# Patient Record
Sex: FEMALE | NOT HISPANIC OR LATINO
[De-identification: names, ages, dates, MRNs, and addresses within clinical notes are randomized per-mention and may not be internally consistent; named-entity substitution may affect disease eponyms.]

---

## 2017-01-06 ENCOUNTER — PRIOR ORIGINAL RECORDS (OUTPATIENT)
Dept: OTHER | Age: 82
End: 2017-01-06

## 2017-03-13 PROBLEM — I70.0 ATHEROSCLEROSIS OF AORTA (HCC): Status: ACTIVE | Noted: 2017-03-13

## 2017-04-03 PROBLEM — E27.8 ADRENAL NODULE (HCC): Status: ACTIVE | Noted: 2017-04-03

## 2017-04-03 PROBLEM — I77.811 ECTATIC ABDOMINAL AORTA (HCC): Status: ACTIVE | Noted: 2017-04-03

## 2017-07-03 DIAGNOSIS — I10 ESSENTIAL HYPERTENSION, BENIGN: ICD-10-CM

## 2017-07-05 RX ORDER — TRIAMTERENE AND HYDROCHLOROTHIAZIDE 37.5; 25 MG/1; MG/1
1 TABLET ORAL
Qty: 90 TABLET | Refills: 0 | OUTPATIENT
Start: 2017-07-05

## 2017-07-10 ENCOUNTER — PRIOR ORIGINAL RECORDS (OUTPATIENT)
Dept: OTHER | Age: 82
End: 2017-07-10

## 2017-07-12 ENCOUNTER — TELEPHONE (OUTPATIENT)
Dept: FAMILY MEDICINE CLINIC | Facility: CLINIC | Age: 82
End: 2017-07-12

## 2017-07-12 PROBLEM — M47.819 ARTHRITIS OF SPINE: Status: ACTIVE | Noted: 2017-07-12

## 2017-07-12 NOTE — TELEPHONE ENCOUNTER
[facility] requesting a return call to schedule OV-MA Supervisit? ?  Last OV MA Supervisit 5-.

## 2017-07-13 ENCOUNTER — OFFICE VISIT (OUTPATIENT)
Dept: FAMILY MEDICINE CLINIC | Facility: CLINIC | Age: 82
End: 2017-07-13

## 2017-07-13 VITALS
TEMPERATURE: 98 F | DIASTOLIC BLOOD PRESSURE: 78 MMHG | WEIGHT: 158 LBS | BODY MASS INDEX: 29.83 KG/M2 | SYSTOLIC BLOOD PRESSURE: 126 MMHG | HEART RATE: 69 BPM | OXYGEN SATURATION: 96 % | RESPIRATION RATE: 18 BRPM | HEIGHT: 61 IN

## 2017-07-13 DIAGNOSIS — M47.819 ARTHRITIS OF SPINE: ICD-10-CM

## 2017-07-13 DIAGNOSIS — M81.0 OSTEOPOROSIS, UNSPECIFIED OSTEOPOROSIS TYPE, UNSPECIFIED PATHOLOGICAL FRACTURE PRESENCE: ICD-10-CM

## 2017-07-13 DIAGNOSIS — I10 ESSENTIAL HYPERTENSION, BENIGN: ICD-10-CM

## 2017-07-13 DIAGNOSIS — E27.8 ADRENAL NODULE (HCC): ICD-10-CM

## 2017-07-13 DIAGNOSIS — I70.0 ATHEROSCLEROSIS OF AORTA (HCC): ICD-10-CM

## 2017-07-13 DIAGNOSIS — C44.91 BASAL CELL CARCINOMA: ICD-10-CM

## 2017-07-13 DIAGNOSIS — C18.9 ADENOCARCINOMA OF COLON (HCC): ICD-10-CM

## 2017-07-13 DIAGNOSIS — Z87.39 H/O ROTATOR CUFF TEAR: ICD-10-CM

## 2017-07-13 DIAGNOSIS — I77.811 ECTATIC ABDOMINAL AORTA (HCC): ICD-10-CM

## 2017-07-13 DIAGNOSIS — E78.00 PURE HYPERCHOLESTEROLEMIA: ICD-10-CM

## 2017-07-13 DIAGNOSIS — D09.9 SQUAMOUS CELL CARCINOMA IN SITU: Primary | ICD-10-CM

## 2017-07-13 PROCEDURE — 99397 PER PM REEVAL EST PAT 65+ YR: CPT | Performed by: PHYSICIAN ASSISTANT

## 2017-07-13 PROCEDURE — G0439 PPPS, SUBSEQ VISIT: HCPCS | Performed by: PHYSICIAN ASSISTANT

## 2017-07-13 PROCEDURE — 96160 PT-FOCUSED HLTH RISK ASSMT: CPT | Performed by: PHYSICIAN ASSISTANT

## 2017-07-13 RX ORDER — SIMVASTATIN 20 MG
TABLET ORAL
Qty: 90 TABLET | Refills: 3 | Status: SHIPPED | OUTPATIENT
Start: 2017-07-13 | End: 2018-08-20

## 2017-07-13 RX ORDER — TRIAMTERENE AND HYDROCHLOROTHIAZIDE 37.5; 25 MG/1; MG/1
1 TABLET ORAL
Qty: 90 TABLET | Refills: 3 | Status: SHIPPED | OUTPATIENT
Start: 2017-07-13 | End: 2018-07-16

## 2017-07-13 NOTE — PROGRESS NOTES
Jaime Landau is a 80year old female who presents for a MA Supervisit. Pt here for refill on her triamterene and simvastatin. Ran out of triamterene 2-3 days ago.  Noticed increased swelling in her lower legs and feet since running out of her medicatio AST 24 05/14/2015   AST 9 (L) 08/01/2014       Lab Results  Component Value Date   ALT 17 05/19/2016   ALT 18 05/14/2015   ALT 9 (L) 08/01/2014     No results found for: TSH    Lab Results  Component Value Date   BUN 20 05/19/2016   BUN 21 (H) 05/14/2015 multiple medications?: 1-Yes          Have you had any memory issues?: 0-No    Fall/Risk Scorin    Scoring Interpretation: 0 - 3 No Risk     Depression Screening (PHQ-2/PHQ-9): Over the LAST 2 WEEKS   Little interest or pleasure in doing things (over t ago; recommend yearly eye exam    Bone Density Screening      Dexascan Every two years Last Dexa Scan:   XR DEXA BONE DENSITOMETRY (CPT=77080) 04/01/2013    No flowsheet data found.     Pap and Pelvic      Pap: Every 3 yrs age 21-65 or Pap+HPV every 5 yrs a LDL  Annually LDL CHOLESTROL (mg/dL)   Date Value   05/22/2014 62     LDL Cholesterol (mg/dL)   Date Value   05/19/2016 129    No flowsheet data found. Dilated Eye exam  Annually No flowsheet data found. No flowsheet data found.     COPD      Spirom History   Problem Relation Age of Onset   • Psychiatric Neg       SOCIAL HISTORY:   Smoking status: Never Smoker                                                              Smokeless tobacco: Never Used                      Alcohol use:  No              Oc good BS's, no masses, HSM or tenderness  : deferred  RECTAL: deferred  MUSCULOSKELETAL: back is not tender, FROM of the back, no calf tenderness or swelling, Agustina's sign negative.   EXTREMITIES: no cyanosis or clubbing  NEURO: Oriented times three, crani patient is asked to return in 6 months for routine follow up with PCP.   Diet counseling perfomed  Exercise counseling perfomed    SUGGESTED VACCINATIONS - Influenza, Pneumococcal, Zoster, Tetanus     Immunization History   Administered Date(s) Administered

## 2017-07-21 ENCOUNTER — APPOINTMENT (OUTPATIENT)
Dept: LAB | Age: 82
End: 2017-07-21
Attending: PHYSICIAN ASSISTANT
Payer: MEDICARE

## 2017-07-21 DIAGNOSIS — E78.00 PURE HYPERCHOLESTEROLEMIA: ICD-10-CM

## 2017-07-21 DIAGNOSIS — I10 ESSENTIAL HYPERTENSION, BENIGN: ICD-10-CM

## 2017-07-21 LAB
ALBUMIN SERPL-MCNC: 3.7 G/DL (ref 3.5–4.8)
ALP LIVER SERPL-CCNC: 80 U/L (ref 55–142)
ALT SERPL-CCNC: 13 U/L (ref 14–54)
AST SERPL-CCNC: 16 U/L (ref 15–41)
BILIRUB SERPL-MCNC: 0.4 MG/DL (ref 0.1–2)
BUN BLD-MCNC: 15 MG/DL (ref 8–20)
CALCIUM BLD-MCNC: 9.5 MG/DL (ref 8.3–10.3)
CHLORIDE: 105 MMOL/L (ref 101–111)
CHOLEST SMN-MCNC: 172 MG/DL (ref ?–200)
CO2: 27 MMOL/L (ref 22–32)
CREAT BLD-MCNC: 0.79 MG/DL (ref 0.55–1.02)
GLUCOSE BLD-MCNC: 101 MG/DL (ref 70–99)
HDLC SERPL-MCNC: 62 MG/DL (ref 45–?)
HDLC SERPL: 2.77 {RATIO} (ref ?–4.44)
LDLC SERPL CALC-MCNC: 73 MG/DL (ref ?–130)
LDLC SERPL-MCNC: 37 MG/DL (ref 5–40)
M PROTEIN MFR SERPL ELPH: 7 G/DL (ref 6.1–8.3)
NONHDLC SERPL-MCNC: 110 MG/DL (ref ?–130)
POTASSIUM SERPL-SCNC: 3.7 MMOL/L (ref 3.6–5.1)
SODIUM SERPL-SCNC: 141 MMOL/L (ref 136–144)
TRIGLYCERIDES: 186 MG/DL (ref ?–150)

## 2017-07-21 PROCEDURE — 80061 LIPID PANEL: CPT | Performed by: PHYSICIAN ASSISTANT

## 2017-07-21 PROCEDURE — 36415 COLL VENOUS BLD VENIPUNCTURE: CPT | Performed by: PHYSICIAN ASSISTANT

## 2017-07-21 PROCEDURE — 80053 COMPREHEN METABOLIC PANEL: CPT | Performed by: PHYSICIAN ASSISTANT

## 2018-01-08 ENCOUNTER — TELEPHONE (OUTPATIENT)
Dept: FAMILY MEDICINE CLINIC | Facility: CLINIC | Age: 83
End: 2018-01-08

## 2018-03-20 ENCOUNTER — PRIOR ORIGINAL RECORDS (OUTPATIENT)
Dept: OTHER | Age: 83
End: 2018-03-20

## 2018-04-18 ENCOUNTER — PATIENT OUTREACH (OUTPATIENT)
Dept: FAMILY MEDICINE CLINIC | Facility: CLINIC | Age: 83
End: 2018-04-18

## 2018-04-18 NOTE — PROGRESS NOTES
Spoke with patient about scheduling her MA supervisit. She stated she will need to get back to us to schedule. She would need to arrange transportation.

## 2018-05-08 ENCOUNTER — PATIENT OUTREACH (OUTPATIENT)
Dept: FAMILY MEDICINE CLINIC | Facility: CLINIC | Age: 83
End: 2018-05-08

## 2018-07-16 DIAGNOSIS — I10 ESSENTIAL HYPERTENSION, BENIGN: ICD-10-CM

## 2018-07-16 RX ORDER — TRIAMTERENE AND HYDROCHLOROTHIAZIDE 37.5; 25 MG/1; MG/1
TABLET ORAL
Qty: 90 TABLET | Refills: 0 | OUTPATIENT
Start: 2018-07-16

## 2018-07-16 RX ORDER — TRIAMTERENE AND HYDROCHLOROTHIAZIDE 37.5; 25 MG/1; MG/1
TABLET ORAL
Qty: 30 TABLET | Refills: 0 | Status: SHIPPED | OUTPATIENT
Start: 2018-07-16 | End: 2018-08-20

## 2018-08-20 ENCOUNTER — OFFICE VISIT (OUTPATIENT)
Dept: FAMILY MEDICINE CLINIC | Facility: CLINIC | Age: 83
End: 2018-08-20
Payer: COMMERCIAL

## 2018-08-20 VITALS
SYSTOLIC BLOOD PRESSURE: 124 MMHG | RESPIRATION RATE: 16 BRPM | DIASTOLIC BLOOD PRESSURE: 78 MMHG | HEIGHT: 61 IN | BODY MASS INDEX: 30.28 KG/M2 | OXYGEN SATURATION: 99 % | HEART RATE: 66 BPM | WEIGHT: 160.38 LBS | TEMPERATURE: 97 F

## 2018-08-20 DIAGNOSIS — M47.819 ARTHRITIS OF SPINE: ICD-10-CM

## 2018-08-20 DIAGNOSIS — I70.0 ATHEROSCLEROSIS OF AORTA (HCC): ICD-10-CM

## 2018-08-20 DIAGNOSIS — E27.8 ADRENAL NODULE (HCC): ICD-10-CM

## 2018-08-20 DIAGNOSIS — E78.00 PURE HYPERCHOLESTEROLEMIA: ICD-10-CM

## 2018-08-20 DIAGNOSIS — M81.8 OTHER OSTEOPOROSIS WITHOUT CURRENT PATHOLOGICAL FRACTURE: ICD-10-CM

## 2018-08-20 DIAGNOSIS — Z87.39 H/O ROTATOR CUFF TEAR: ICD-10-CM

## 2018-08-20 DIAGNOSIS — I10 ESSENTIAL HYPERTENSION, BENIGN: Primary | ICD-10-CM

## 2018-08-20 DIAGNOSIS — C18.9 ADENOCARCINOMA OF COLON (HCC): ICD-10-CM

## 2018-08-20 DIAGNOSIS — Z85.038 HISTORY OF MALIGNANT NEOPLASM OF COLON: ICD-10-CM

## 2018-08-20 DIAGNOSIS — I77.811 ECTATIC ABDOMINAL AORTA (HCC): ICD-10-CM

## 2018-08-20 DIAGNOSIS — C44.91 BASAL CELL CARCINOMA, UNSPECIFIED SITE: ICD-10-CM

## 2018-08-20 DIAGNOSIS — D09.9 SQUAMOUS CELL CARCINOMA IN SITU: ICD-10-CM

## 2018-08-20 DIAGNOSIS — B35.1 ONYCHOMYCOSIS: ICD-10-CM

## 2018-08-20 DIAGNOSIS — R06.02 SHORTNESS OF BREATH: ICD-10-CM

## 2018-08-20 PROCEDURE — 99397 PER PM REEVAL EST PAT 65+ YR: CPT | Performed by: PHYSICIAN ASSISTANT

## 2018-08-20 PROCEDURE — 96160 PT-FOCUSED HLTH RISK ASSMT: CPT | Performed by: PHYSICIAN ASSISTANT

## 2018-08-20 PROCEDURE — G0439 PPPS, SUBSEQ VISIT: HCPCS | Performed by: PHYSICIAN ASSISTANT

## 2018-08-20 RX ORDER — TRIAMTERENE AND HYDROCHLOROTHIAZIDE 37.5; 25 MG/1; MG/1
1 TABLET ORAL
Qty: 90 TABLET | Refills: 3 | Status: SHIPPED | OUTPATIENT
Start: 2018-08-20 | End: 2018-11-01 | Stop reason: ALTCHOICE

## 2018-08-20 RX ORDER — SIMVASTATIN 20 MG
TABLET ORAL
Qty: 90 TABLET | Refills: 3 | Status: ON HOLD | OUTPATIENT
Start: 2018-08-20 | End: 2019-06-21

## 2018-08-20 NOTE — PROGRESS NOTES
REASON FOR VISIT:    Mervat Greene is a 80year old female who presents for a MA Supervisit. Pt's daughter states pt seems more short of breath when walking from the car to a building. Has noticed off and on for the past 1 month.  Pt denies feeling shor 11/27/2012 10/6/2011   Total Cholesterol <200 mg/dL 172 222(H) 205(H) 135 139 172 165   Triglycerides <150 mg/dL 186(H) 136 141 106 100 79 115   HDL >45 mg/dL 62 66 64 52 53 73 60   LDL <130 mg/dL 73 129 113 62 66 83 82     BUN and Cr Latest Ref Rng & Unit months?: 0-No  Fall/Risk Scorin        Depression Screening (PHQ-2/PHQ-9): Over the LAST 2 WEEKS   Little interest or pleasure in doing things (over the last two weeks)?: Several days  Feeling down, depressed, or hopeless (over the last two weeks)?: No Annually CREATININE (mg/dL)   Date Value   08/04/2014 0.40     Creatinine (mg/dL)   Date Value   07/21/2017 0.79       Digoxin Serum Conc  Annually No results found for: DIGOXIN          ALLERGIES:     Prednisone                  Comment:Rash after 2 ster Immunization History  Administered            Date(s) Administered    Fluzone Vaccine Medicare ()                          09/05/2017      Influenza             10/06/2011  09/27/2013      Influenza Vaccine, High Dose, Preserv Free bruits  CHEST: no chest tenderness  BREAST: pt declined  LUNGS: clear to auscultation  CARDIO: RRR without murmur, no pitting edema b/l LE, distal pulses 2+ bilaterally  GI: good BS's, no masses, HSM or tenderness  : deferred  RECTAL: deferred  MUSCULOSK podiatry for toenail care  -     PODIATRY - INTERNAL    Shortness of breath  Labs ordered  Echo ordered  ER if worse or symptoms change  -     IRON AND TIBC; Future  -     FERRITIN; Future  -     CARD ECHO 2D DOPPLER (CPT=93306);  Future    Discussed shingr

## 2018-09-05 DIAGNOSIS — E78.00 PURE HYPERCHOLESTEROLEMIA: ICD-10-CM

## 2018-09-05 RX ORDER — SIMVASTATIN 20 MG
TABLET ORAL
Qty: 90 TABLET | Refills: 0 | OUTPATIENT
Start: 2018-09-05

## 2018-09-06 ENCOUNTER — HOSPITAL ENCOUNTER (OUTPATIENT)
Dept: CV DIAGNOSTICS | Age: 83
Discharge: HOME | End: 2018-09-06
Attending: PHYSICIAN ASSISTANT
Payer: MEDICARE

## 2018-09-06 DIAGNOSIS — R06.02 SHORTNESS OF BREATH: ICD-10-CM

## 2018-09-06 PROCEDURE — 93306 TTE W/DOPPLER COMPLETE: CPT | Performed by: PHYSICIAN ASSISTANT

## 2018-09-14 ENCOUNTER — PRIOR ORIGINAL RECORDS (OUTPATIENT)
Dept: OTHER | Age: 83
End: 2018-09-14

## 2018-09-14 ENCOUNTER — LAB ENCOUNTER (OUTPATIENT)
Dept: LAB | Age: 83
End: 2018-09-14
Attending: PHYSICIAN ASSISTANT
Payer: MEDICARE

## 2018-09-14 DIAGNOSIS — E78.00 PURE HYPERCHOLESTEROLEMIA: ICD-10-CM

## 2018-09-14 DIAGNOSIS — R06.02 SHORTNESS OF BREATH: ICD-10-CM

## 2018-09-14 DIAGNOSIS — I10 ESSENTIAL HYPERTENSION, BENIGN: ICD-10-CM

## 2018-09-14 LAB
ALBUMIN SERPL-MCNC: 3.6 G/DL (ref 3.5–4.8)
ALBUMIN/GLOB SERPL: 1 {RATIO} (ref 1–2)
ALP LIVER SERPL-CCNC: 67 U/L (ref 55–142)
ALT SERPL-CCNC: 15 U/L (ref 14–54)
ANION GAP SERPL CALC-SCNC: 8 MMOL/L (ref 0–18)
AST SERPL-CCNC: 20 U/L (ref 15–41)
BASOPHILS # BLD AUTO: 0.02 X10(3) UL (ref 0–0.1)
BASOPHILS NFR BLD AUTO: 0.3 %
BILIRUB SERPL-MCNC: 0.5 MG/DL (ref 0.1–2)
BUN BLD-MCNC: 13 MG/DL (ref 8–20)
BUN/CREAT SERPL: 16.7 (ref 10–20)
CALCIUM BLD-MCNC: 9.5 MG/DL (ref 8.3–10.3)
CHLORIDE SERPL-SCNC: 100 MMOL/L (ref 101–111)
CHOLEST SMN-MCNC: 175 MG/DL (ref ?–200)
CO2 SERPL-SCNC: 29 MMOL/L (ref 22–32)
CREAT BLD-MCNC: 0.78 MG/DL (ref 0.55–1.02)
DEPRECATED HBV CORE AB SER IA-ACNC: 57.4 NG/ML (ref 18–340)
EOSINOPHIL # BLD AUTO: 0.18 X10(3) UL (ref 0–0.3)
EOSINOPHIL NFR BLD AUTO: 2.7 %
ERYTHROCYTE [DISTWIDTH] IN BLOOD BY AUTOMATED COUNT: 14.2 % (ref 11.5–16)
GLOBULIN PLAS-MCNC: 3.7 G/DL (ref 2.5–4)
GLUCOSE BLD-MCNC: 79 MG/DL (ref 70–99)
HCT VFR BLD AUTO: 45.6 % (ref 34–50)
HDLC SERPL-MCNC: 60 MG/DL (ref 40–59)
HGB BLD-MCNC: 15.2 G/DL (ref 12–16)
IMMATURE GRANULOCYTE COUNT: 0.01 X10(3) UL (ref 0–1)
IMMATURE GRANULOCYTE RATIO %: 0.1 %
IRON SATURATION: 20 % (ref 15–50)
IRON: 80 UG/DL (ref 28–170)
LDLC SERPL CALC-MCNC: 77 MG/DL (ref ?–100)
LYMPHOCYTES # BLD AUTO: 2.61 X10(3) UL (ref 0.9–4)
LYMPHOCYTES NFR BLD AUTO: 39 %
M PROTEIN MFR SERPL ELPH: 7.3 G/DL (ref 6.1–8.3)
MCH RBC QN AUTO: 31.1 PG (ref 27–33.2)
MCHC RBC AUTO-ENTMCNC: 33.3 G/DL (ref 31–37)
MCV RBC AUTO: 93.4 FL (ref 81–100)
MONOCYTES # BLD AUTO: 0.53 X10(3) UL (ref 0.1–1)
MONOCYTES NFR BLD AUTO: 7.9 %
NEUTROPHIL ABS PRELIM: 3.35 X10 (3) UL (ref 1.3–6.7)
NEUTROPHILS # BLD AUTO: 3.35 X10(3) UL (ref 1.3–6.7)
NEUTROPHILS NFR BLD AUTO: 50 %
NONHDLC SERPL-MCNC: 115 MG/DL (ref ?–130)
OSMOLALITY SERPL CALC.SUM OF ELEC: 283 MOSM/KG (ref 275–295)
PLATELET # BLD AUTO: 230 10(3)UL (ref 150–450)
POTASSIUM SERPL-SCNC: 3.3 MMOL/L (ref 3.6–5.1)
RBC # BLD AUTO: 4.88 X10(6)UL (ref 3.8–5.1)
RED CELL DISTRIBUTION WIDTH-SD: 48.4 FL (ref 35.1–46.3)
SODIUM SERPL-SCNC: 137 MMOL/L (ref 136–144)
TOTAL IRON BINDING CAPACITY: 395 UG/DL (ref 240–450)
TRANSFERRIN SERPL-MCNC: 265 MG/DL (ref 200–360)
TRIGL SERPL-MCNC: 188 MG/DL (ref 30–149)
VLDLC SERPL CALC-MCNC: 38 MG/DL (ref 0–30)
WBC # BLD AUTO: 6.7 X10(3) UL (ref 4–13)

## 2018-09-14 PROCEDURE — 82728 ASSAY OF FERRITIN: CPT

## 2018-09-14 PROCEDURE — 85025 COMPLETE CBC W/AUTO DIFF WBC: CPT

## 2018-09-14 PROCEDURE — 83550 IRON BINDING TEST: CPT

## 2018-09-14 PROCEDURE — 80061 LIPID PANEL: CPT

## 2018-09-14 PROCEDURE — 83540 ASSAY OF IRON: CPT

## 2018-09-14 PROCEDURE — 80053 COMPREHEN METABOLIC PANEL: CPT

## 2018-09-14 PROCEDURE — 36415 COLL VENOUS BLD VENIPUNCTURE: CPT

## 2018-09-15 ENCOUNTER — TELEPHONE (OUTPATIENT)
Dept: FAMILY MEDICINE CLINIC | Facility: CLINIC | Age: 83
End: 2018-09-15

## 2018-09-15 DIAGNOSIS — E87.6 LOW BLOOD POTASSIUM: Primary | ICD-10-CM

## 2018-09-15 NOTE — TELEPHONE ENCOUNTER
----- Message from [place], [place] sent at 9/15/2018  8:14 AM CDT -----  CBC normal. Potassium is low; needs repeat potassium ASAP. Sugar, kidney and liver function are normal. Iron studies normal. Triglycerides a little elevated; monitor starches and carbs in diet.

## 2018-09-17 ENCOUNTER — PRIOR ORIGINAL RECORDS (OUTPATIENT)
Dept: OTHER | Age: 83
End: 2018-09-17

## 2018-09-17 ENCOUNTER — APPOINTMENT (OUTPATIENT)
Dept: LAB | Age: 83
End: 2018-09-17
Attending: PHYSICIAN ASSISTANT
Payer: MEDICARE

## 2018-09-17 DIAGNOSIS — E87.6 LOW BLOOD POTASSIUM: ICD-10-CM

## 2018-09-17 DIAGNOSIS — E87.6 HYPOKALEMIA: Primary | ICD-10-CM

## 2018-09-17 LAB — POTASSIUM SERPL-SCNC: 3.3 MMOL/L (ref 3.6–5.1)

## 2018-09-17 PROCEDURE — 84132 ASSAY OF SERUM POTASSIUM: CPT

## 2018-09-17 PROCEDURE — 36415 COLL VENOUS BLD VENIPUNCTURE: CPT

## 2018-09-17 RX ORDER — POTASSIUM CHLORIDE 750 MG/1
10 TABLET, FILM COATED, EXTENDED RELEASE ORAL DAILY
Qty: 7 TABLET | Refills: 0 | Status: SHIPPED | OUTPATIENT
Start: 2018-09-17 | End: 2018-09-24

## 2018-09-20 RX ORDER — POTASSIUM CHLORIDE 750 MG/1
TABLET, FILM COATED, EXTENDED RELEASE ORAL
Qty: 90 TABLET | Refills: 0 | OUTPATIENT
Start: 2018-09-20

## 2018-09-20 NOTE — TELEPHONE ENCOUNTER
Last Visit- 9/17/18        Last refill 9/17/18  7 day supply           Please Approve or deny Medication. I wasn't sure if you wanted or needed to do another order.

## 2018-09-25 ENCOUNTER — APPOINTMENT (OUTPATIENT)
Dept: LAB | Age: 83
End: 2018-09-25
Attending: PHYSICIAN ASSISTANT
Payer: MEDICARE

## 2018-09-25 ENCOUNTER — PRIOR ORIGINAL RECORDS (OUTPATIENT)
Dept: OTHER | Age: 83
End: 2018-09-25

## 2018-09-25 DIAGNOSIS — E87.6 HYPOKALEMIA: ICD-10-CM

## 2018-09-25 LAB — POTASSIUM SERPL-SCNC: 3.4 MMOL/L (ref 3.6–5.1)

## 2018-09-25 PROCEDURE — 36415 COLL VENOUS BLD VENIPUNCTURE: CPT

## 2018-09-25 PROCEDURE — 84132 ASSAY OF SERUM POTASSIUM: CPT

## 2018-09-27 RX ORDER — POTASSIUM CHLORIDE 1500 MG/1
TABLET, FILM COATED, EXTENDED RELEASE ORAL
Qty: 90 TABLET | Refills: 0 | OUTPATIENT
Start: 2018-09-27

## 2018-10-02 ENCOUNTER — APPOINTMENT (OUTPATIENT)
Dept: LAB | Age: 83
End: 2018-10-02
Attending: PHYSICIAN ASSISTANT
Payer: MEDICARE

## 2018-10-02 ENCOUNTER — PRIOR ORIGINAL RECORDS (OUTPATIENT)
Dept: OTHER | Age: 83
End: 2018-10-02

## 2018-10-02 DIAGNOSIS — E87.6 LOW BLOOD POTASSIUM: ICD-10-CM

## 2018-10-02 PROCEDURE — 36415 COLL VENOUS BLD VENIPUNCTURE: CPT

## 2018-10-02 PROCEDURE — 84132 ASSAY OF SERUM POTASSIUM: CPT

## 2018-10-03 ENCOUNTER — TELEPHONE (OUTPATIENT)
Dept: FAMILY MEDICINE CLINIC | Facility: CLINIC | Age: 83
End: 2018-10-03

## 2018-10-03 DIAGNOSIS — E87.6 LOW BLOOD POTASSIUM: Primary | ICD-10-CM

## 2018-10-03 DIAGNOSIS — R79.9 ABNORMAL BLOOD CHEMISTRY: ICD-10-CM

## 2018-10-03 RX ORDER — POTASSIUM CHLORIDE 1500 MG/1
TABLET, EXTENDED RELEASE ORAL
Qty: 30 TABLET | Refills: 0 | Status: SHIPPED | OUTPATIENT
Start: 2018-10-03 | End: 2018-12-12

## 2018-10-03 NOTE — TELEPHONE ENCOUNTER
----- Message from [place] sent at 10/3/2018 11:26 AM CDT -----  Ok for 30 day supply and then needs follow up. Repeat potassium 2-4 weeks.

## 2018-10-03 NOTE — TELEPHONE ENCOUNTER
----- Message from Jackelyn [place], [place] sent at 10/3/2018 11:26 AM CDT -----  Ok for 30 day supply and then needs follow up. Repeat potassium 2-4 weeks.

## 2018-10-04 RX ORDER — POTASSIUM CHLORIDE 1500 MG/1
TABLET, EXTENDED RELEASE ORAL
Qty: 90 TABLET | Refills: 0 | OUTPATIENT
Start: 2018-10-04

## 2018-10-08 ENCOUNTER — PRIOR ORIGINAL RECORDS (OUTPATIENT)
Dept: OTHER | Age: 83
End: 2018-10-08

## 2018-10-08 ENCOUNTER — MYAURORA ACCOUNT LINK (OUTPATIENT)
Dept: OTHER | Age: 83
End: 2018-10-08

## 2018-10-11 LAB
ALBUMIN: 3.6 G/DL
ALKALINE PHOSPHATATE(ALK PHOS): 67 IU/L
BILIRUBIN TOTAL: 0.5 MG/DL
BUN: 13 MG/DL
CALCIUM: 9.5 MG/DL
CHLORIDE: 100 MEQ/L
CHOLESTEROL, TOTAL: 175 MG/DL
CREATININE, SERUM: 0.78 MG/DL
GLOBULIN: 3.7 G/DL
GLUCOSE: 79 MG/DL
HDL CHOLESTEROL: 60 MG/DL
HEMATOCRIT: 45.6 %
HEMOGLOBIN: 15.2 G/DL
IRON, TOTAL: 20 MCG/DL
LDL CHOLESTEROL: 77 MG/DL
PLATELETS: 230 K/UL
POTASSIUM, SERUM: 3.3 MEQ/L
POTASSIUM, SERUM: 3.3 MEQ/L
POTASSIUM, SERUM: 3.4 MEQ/L
POTASSIUM, SERUM: 3.7 MEQ/L
PROTEIN, TOTAL: 7.3 G/DL
RED BLOOD COUNT: 4.88 X 10-6/U
SGOT (AST): 20 IU/L
SGPT (ALT): 15 IU/L
SODIUM: 137 MEQ/L
TRIGLYCERIDES: 188 MG/DL
WHITE BLOOD COUNT: 6.7 X 10-3/U

## 2018-10-25 NOTE — PROGRESS NOTES
Nursing Consultation Note  Patient: Jaime Landau  YOB: 1927  Age: 80year old  Radiation Oncologist: Dr. Brett Carlisle  Referring Physician: [email or web address]  Consult Date: 10/25/2018      Chemotherapy: n/a  Labs: R prednisone per chart)      Current Outpatient Medications:  Potassium Chloride ER 20 MEQ Oral Tab CR Take 1 tablet by mouth daily. Disp: 30 tablet Rfl: 0   Triamterene-HCTZ 37.5-25 MG Oral Tab Take 1 tablet by mouth once daily.  Disp: 90 tablet Rfl: nose / Mohs surgery   • SKIN SURGERY  8-12-10    BCC-nodular to right nose/ MOHs surgery   • SKIN SURGERY  11/01/2011    SCCIS / L temple / bx by Dr. Diego Muir / Jaleel Vazquez by Dr. Shell Barton   • SKIN SURGERY  02/26/2014    SCC to left forehead/

## 2018-11-01 ENCOUNTER — OFFICE VISIT (OUTPATIENT)
Dept: PODIATRY CLINIC | Facility: CLINIC | Age: 83
End: 2018-11-01
Payer: COMMERCIAL

## 2018-11-01 ENCOUNTER — HOSPITAL ENCOUNTER (OUTPATIENT)
Dept: RADIATION ONCOLOGY | Facility: HOSPITAL | Age: 83
Discharge: HOME | End: 2018-11-01
Attending: RADIOLOGY
Payer: MEDICARE

## 2018-11-01 VITALS
SYSTOLIC BLOOD PRESSURE: 114 MMHG | HEART RATE: 74 BPM | BODY MASS INDEX: 30 KG/M2 | OXYGEN SATURATION: 98 % | RESPIRATION RATE: 17 BRPM | DIASTOLIC BLOOD PRESSURE: 74 MMHG | WEIGHT: 160 LBS

## 2018-11-01 DIAGNOSIS — B35.1 ONYCHOMYCOSIS: Primary | ICD-10-CM

## 2018-11-01 DIAGNOSIS — C44.709: Primary | ICD-10-CM

## 2018-11-01 DIAGNOSIS — L60.0 ONYCHOCRYPTOSIS: ICD-10-CM

## 2018-11-01 PROCEDURE — 87107 FUNGI IDENTIFICATION MOLD: CPT | Performed by: PODIATRIST

## 2018-11-01 PROCEDURE — 11721 DEBRIDE NAIL 6 OR MORE: CPT | Performed by: PODIATRIST

## 2018-11-01 PROCEDURE — 99203 OFFICE O/P NEW LOW 30 MIN: CPT | Performed by: PODIATRIST

## 2018-11-01 PROCEDURE — 87101 SKIN FUNGI CULTURE: CPT | Performed by: PODIATRIST

## 2018-11-01 PROCEDURE — 99214 OFFICE O/P EST MOD 30 MIN: CPT

## 2018-11-01 PROCEDURE — 11755 BIOPSY NAIL UNIT: CPT | Performed by: PODIATRIST

## 2018-11-01 RX ORDER — FUROSEMIDE 20 MG/1
TABLET ORAL
Refills: 0 | Status: ON HOLD | COMMUNITY
Start: 2018-10-09 | End: 2019-06-19

## 2018-11-01 RX ORDER — POTASSIUM CHLORIDE 20 MEQ/1
TABLET, EXTENDED RELEASE ORAL
Refills: 0 | COMMUNITY
Start: 2018-09-26 | End: 2018-11-01 | Stop reason: ALTCHOICE

## 2018-11-04 NOTE — PROGRESS NOTES
Lena Dumont is a 80year old female. Patient presents with:  Toenail Care: bilateral feet fungal infection to nails.  nail discomfort when nails get too long         HPI:   This very pleasant patient presents to the clinic with her daughter she is compla ABSCESS IRRIGATION & DEBRIDEMENT;  Surgeon: Missy Tracy MD;  Location: [address] [place] [address] OR   • SKIN SURGERY  12/9/09    BCC ulcerated to R nose / Mohs surgery   • SKIN SURGERY  8-12-10    BCC-nodular to right nose/ MOHs surgery   • SKIN SURGERY  11/01/2011    SCCIS / denies heartburn  NEURO: denies headaches    EXAM:   There were no vitals taken for this visit. Physical Exam  GENERAL: well developed, well nourished, in no apparent distress  EXTREMITIES:   1. Integument: Skin on both feet was examined.   Her toenails 1-

## 2018-11-05 ENCOUNTER — HOSPITAL ENCOUNTER (OUTPATIENT)
Dept: RADIATION ONCOLOGY | Facility: HOSPITAL | Age: 83
Discharge: HOME | End: 2018-11-05
Attending: RADIOLOGY
Payer: MEDICARE

## 2018-11-05 PROCEDURE — 77290 THER RAD SIMULAJ FIELD CPLX: CPT | Performed by: RADIOLOGY

## 2018-11-05 PROCEDURE — 77334 RADIATION TREATMENT AID(S): CPT | Performed by: RADIOLOGY

## 2018-11-06 ENCOUNTER — TELEPHONE (OUTPATIENT)
Dept: ORTHOPEDICS CLINIC | Facility: CLINIC | Age: 83
End: 2018-11-06

## 2018-11-06 NOTE — TELEPHONE ENCOUNTER
----- Message from Amrita Monroe DPM sent at 11/5/2018 11:22 AM CST -----  There is no need to inform at this time it is too soon

## 2018-11-09 PROCEDURE — 77334 RADIATION TREATMENT AID(S): CPT | Performed by: RADIOLOGY

## 2018-11-09 PROCEDURE — 77321 SPECIAL TELETX PORT PLAN: CPT | Performed by: RADIOLOGY

## 2018-11-09 PROCEDURE — 77290 THER RAD SIMULAJ FIELD CPLX: CPT | Performed by: RADIOLOGY

## 2018-11-14 NOTE — CONSULTS
[address]    PATIENT'S NAME: Abnerarnold Kaiser   RADIATION ONCOLOGIST: Emma Retana. Garfield Masterson M.D.    PATIENT ACCOUNT #: [de-identified] Radha Wells   [facility]   MEDICAL RECORD #: [id number] YOB: 1927   CONSULTATION DATE: 11/01/2018       RADIATIO performance score of 2 and a pain score of 0. VITAL SIGNS:  Blood pressure 114/74, pulse 74, respiratory rate 17, and she is afebrile. Her weight is 160 pounds. NECK:  Supple, with no lymphadenopathy. LUNGS:  Clear to auscultation bilaterally.   HEART: treatment quite well. There will be a skin reaction in the foot akin to a sunburn. This includes redness, itching, peeling, dryness, and blistering.   I will be treating the lesion itself plus a margin around it, and this is the area in which the skin cherelle

## 2018-11-19 ENCOUNTER — HOSPITAL ENCOUNTER (OUTPATIENT)
Dept: RADIATION ONCOLOGY | Facility: HOSPITAL | Age: 83
Discharge: HOME | End: 2018-11-19
Attending: RADIOLOGY
Payer: MEDICARE

## 2018-11-19 VITALS
WEIGHT: 160 LBS | SYSTOLIC BLOOD PRESSURE: 106 MMHG | DIASTOLIC BLOOD PRESSURE: 74 MMHG | TEMPERATURE: 98 F | OXYGEN SATURATION: 100 % | RESPIRATION RATE: 18 BRPM | BODY MASS INDEX: 30 KG/M2 | HEART RATE: 70 BPM

## 2018-11-19 DIAGNOSIS — C44.709: Primary | ICD-10-CM

## 2018-11-19 PROCEDURE — 77332 RADIATION TREATMENT AID(S): CPT | Performed by: RADIOLOGY

## 2018-11-19 PROCEDURE — 77412 RADIATION TX DELIVERY LVL 3: CPT | Performed by: RADIOLOGY

## 2018-11-19 PROCEDURE — 77280 THER RAD SIMULAJ FIELD SMPL: CPT | Performed by: RADIOLOGY

## 2018-11-19 NOTE — PROGRESS NOTES
[facility] Radiation Treatment Management Note 1-5    Patient:  Sienna Perez  Age:  80year old  Visit Diagnosis:    1.  Primary cancer of skin of left lower leg      Primary Rad/Onc:  Dr. Rivera Scarce    Site Delivered Dose (Gy) Pr

## 2018-11-20 PROCEDURE — 77412 RADIATION TX DELIVERY LVL 3: CPT | Performed by: RADIOLOGY

## 2018-11-21 PROCEDURE — 77412 RADIATION TX DELIVERY LVL 3: CPT | Performed by: RADIOLOGY

## 2018-11-26 ENCOUNTER — HOSPITAL ENCOUNTER (OUTPATIENT)
Dept: RADIATION ONCOLOGY | Facility: HOSPITAL | Age: 83
Discharge: HOME | End: 2018-11-26
Attending: RADIOLOGY
Payer: MEDICARE

## 2018-11-26 VITALS — DIASTOLIC BLOOD PRESSURE: 70 MMHG | HEART RATE: 68 BPM | SYSTOLIC BLOOD PRESSURE: 118 MMHG

## 2018-11-26 DIAGNOSIS — C44.709: Primary | ICD-10-CM

## 2018-11-26 PROCEDURE — 77412 RADIATION TX DELIVERY LVL 3: CPT | Performed by: RADIOLOGY

## 2018-11-26 NOTE — PROGRESS NOTES
[facility] Radiation Treatment Management Note 1-5    Patient:  Anoop Hampton  Age:  80year old  Visit Diagnosis:    1.  Primary cancer of skin of left lower leg      Primary Rad/Onc:  Dr. Mari Ortega    Site Delivered Dose (Gy) Pr

## 2018-11-27 PROCEDURE — 77412 RADIATION TX DELIVERY LVL 3: CPT | Performed by: RADIOLOGY

## 2018-11-28 PROCEDURE — 77412 RADIATION TX DELIVERY LVL 3: CPT | Performed by: RADIOLOGY

## 2018-11-29 PROCEDURE — 77412 RADIATION TX DELIVERY LVL 3: CPT | Performed by: RADIOLOGY

## 2018-11-30 PROCEDURE — 77412 RADIATION TX DELIVERY LVL 3: CPT | Performed by: RADIOLOGY

## 2018-11-30 PROCEDURE — 77336 RADIATION PHYSICS CONSULT: CPT | Performed by: RADIOLOGY

## 2018-12-01 ENCOUNTER — HOSPITAL ENCOUNTER (OUTPATIENT)
Dept: RADIATION ONCOLOGY | Facility: HOSPITAL | Age: 83
Discharge: HOME | End: 2018-12-01
Attending: RADIOLOGY
Payer: MEDICARE

## 2018-12-03 ENCOUNTER — HOSPITAL ENCOUNTER (OUTPATIENT)
Dept: RADIATION ONCOLOGY | Facility: HOSPITAL | Age: 83
Discharge: HOME | End: 2018-12-03
Attending: RADIOLOGY
Payer: MEDICARE

## 2018-12-03 VITALS
SYSTOLIC BLOOD PRESSURE: 122 MMHG | HEART RATE: 83 BPM | RESPIRATION RATE: 18 BRPM | TEMPERATURE: 98 F | DIASTOLIC BLOOD PRESSURE: 70 MMHG | OXYGEN SATURATION: 97 %

## 2018-12-03 DIAGNOSIS — C44.709: Primary | ICD-10-CM

## 2018-12-03 PROCEDURE — 77412 RADIATION TX DELIVERY LVL 3: CPT | Performed by: RADIOLOGY

## 2018-12-03 NOTE — PROGRESS NOTES
[facility] Radiation Treatment Management Note 6-10    Patient:  Jian Florence  Age:  80year old  Visit Diagnosis:    1.  Primary cancer of skin of left lower leg      Primary Rad/Onc:  Dr. Bolivar Page    Site Delivered Dose (Gy) P

## 2018-12-04 PROCEDURE — 77412 RADIATION TX DELIVERY LVL 3: CPT | Performed by: RADIOLOGY

## 2018-12-05 PROCEDURE — 77412 RADIATION TX DELIVERY LVL 3: CPT | Performed by: RADIOLOGY

## 2018-12-06 PROCEDURE — 77412 RADIATION TX DELIVERY LVL 3: CPT | Performed by: RADIOLOGY

## 2018-12-07 PROCEDURE — 77336 RADIATION PHYSICS CONSULT: CPT | Performed by: RADIOLOGY

## 2018-12-07 PROCEDURE — 77412 RADIATION TX DELIVERY LVL 3: CPT | Performed by: RADIOLOGY

## 2018-12-10 ENCOUNTER — HOSPITAL ENCOUNTER (OUTPATIENT)
Dept: RADIATION ONCOLOGY | Facility: HOSPITAL | Age: 83
Discharge: HOME | End: 2018-12-10
Attending: RADIOLOGY
Payer: MEDICARE

## 2018-12-10 VITALS
TEMPERATURE: 98 F | RESPIRATION RATE: 18 BRPM | DIASTOLIC BLOOD PRESSURE: 70 MMHG | SYSTOLIC BLOOD PRESSURE: 118 MMHG | HEART RATE: 88 BPM

## 2018-12-10 DIAGNOSIS — C44.709: Primary | ICD-10-CM

## 2018-12-10 PROCEDURE — 77412 RADIATION TX DELIVERY LVL 3: CPT | Performed by: RADIOLOGY

## 2018-12-10 NOTE — PROGRESS NOTES
[facility] Radiation Treatment Management Note 11-15    Patient:  Lena Bracket  Age:  80year old  Visit Diagnosis:    1.  Primary cancer of skin of left lower leg      Primary Rad/Onc:  Dr. Philomena Ibarra    Site Delivered Dose (Gy)

## 2018-12-11 ENCOUNTER — APPOINTMENT (OUTPATIENT)
Dept: LAB | Age: 83
End: 2018-12-11
Attending: PHYSICIAN ASSISTANT
Payer: MEDICARE

## 2018-12-11 DIAGNOSIS — E87.6 LOW BLOOD POTASSIUM: ICD-10-CM

## 2018-12-11 DIAGNOSIS — R79.9 ABNORMAL BLOOD CHEMISTRY: ICD-10-CM

## 2018-12-11 PROCEDURE — 36415 COLL VENOUS BLD VENIPUNCTURE: CPT

## 2018-12-11 PROCEDURE — 84132 ASSAY OF SERUM POTASSIUM: CPT

## 2018-12-11 PROCEDURE — 77412 RADIATION TX DELIVERY LVL 3: CPT | Performed by: RADIOLOGY

## 2018-12-12 PROCEDURE — 77412 RADIATION TX DELIVERY LVL 3: CPT | Performed by: RADIOLOGY

## 2018-12-13 PROCEDURE — 77412 RADIATION TX DELIVERY LVL 3: CPT | Performed by: RADIOLOGY

## 2018-12-14 PROCEDURE — 77412 RADIATION TX DELIVERY LVL 3: CPT | Performed by: RADIOLOGY

## 2018-12-14 PROCEDURE — 77336 RADIATION PHYSICS CONSULT: CPT | Performed by: RADIOLOGY

## 2018-12-14 RX ORDER — POTASSIUM CHLORIDE 1500 MG/1
TABLET, EXTENDED RELEASE ORAL
Qty: 90 TABLET | Refills: 0 | OUTPATIENT
Start: 2018-12-14

## 2018-12-17 ENCOUNTER — HOSPITAL ENCOUNTER (OUTPATIENT)
Dept: RADIATION ONCOLOGY | Facility: HOSPITAL | Age: 83
Discharge: HOME | End: 2018-12-17
Attending: RADIOLOGY
Payer: MEDICARE

## 2018-12-17 VITALS
OXYGEN SATURATION: 97 % | RESPIRATION RATE: 17 BRPM | HEART RATE: 73 BPM | BODY MASS INDEX: 30 KG/M2 | DIASTOLIC BLOOD PRESSURE: 73 MMHG | SYSTOLIC BLOOD PRESSURE: 130 MMHG | WEIGHT: 160 LBS

## 2018-12-17 DIAGNOSIS — C44.709: Primary | ICD-10-CM

## 2018-12-17 PROCEDURE — 77412 RADIATION TX DELIVERY LVL 3: CPT | Performed by: RADIOLOGY

## 2018-12-17 NOTE — PROGRESS NOTES
[facility] Radiation Treatment Management Note 16-20    Patient:  Hiram Kathleen  Age:  80year old  Visit Diagnosis:    1.  Primary cancer of skin of left lower leg      Primary Rad/Onc:  Dr. Amy Grossman    Site Delivered Dose (Gy)

## 2018-12-18 PROCEDURE — 77412 RADIATION TX DELIVERY LVL 3: CPT | Performed by: RADIOLOGY

## 2018-12-19 PROCEDURE — 77412 RADIATION TX DELIVERY LVL 3: CPT | Performed by: RADIOLOGY

## 2018-12-20 PROCEDURE — 77412 RADIATION TX DELIVERY LVL 3: CPT | Performed by: RADIOLOGY

## 2018-12-21 PROCEDURE — 77412 RADIATION TX DELIVERY LVL 3: CPT | Performed by: RADIOLOGY

## 2018-12-21 PROCEDURE — 77336 RADIATION PHYSICS CONSULT: CPT | Performed by: RADIOLOGY

## 2018-12-24 ENCOUNTER — APPOINTMENT (OUTPATIENT)
Dept: RADIATION ONCOLOGY | Facility: HOSPITAL | Age: 83
End: 2018-12-24
Attending: RADIOLOGY
Payer: MEDICARE

## 2018-12-27 ENCOUNTER — HOSPITAL ENCOUNTER (OUTPATIENT)
Dept: RADIATION ONCOLOGY | Facility: HOSPITAL | Age: 83
Discharge: HOME | End: 2018-12-27
Attending: RADIOLOGY
Payer: MEDICARE

## 2018-12-27 VITALS
DIASTOLIC BLOOD PRESSURE: 76 MMHG | BODY MASS INDEX: 30 KG/M2 | RESPIRATION RATE: 16 BRPM | OXYGEN SATURATION: 98 % | SYSTOLIC BLOOD PRESSURE: 134 MMHG | WEIGHT: 160 LBS | HEART RATE: 82 BPM

## 2018-12-27 DIAGNOSIS — C44.709: Primary | ICD-10-CM

## 2018-12-27 PROCEDURE — 77412 RADIATION TX DELIVERY LVL 3: CPT | Performed by: RADIOLOGY

## 2018-12-27 NOTE — PATIENT INSTRUCTIONS
POST-RADIATION INSTRUCTIONS:   - CALL [phone] FOR A FOLLOW-UP WITH DR. MANOLO SMART IN ONE MONTH  - FOLLOW-UP WITH DR. Kierra Coyne AS SCHEDULED  - FOLLOW-UP WITH   - SIDE EFFECTS OF RADIATION WILL GRADUALLY SUBSIDE, IT MAY TAKE UP TO 2 WEEKS POST-RADIA

## 2018-12-27 NOTE — PROGRESS NOTES
[facility] Radiation Treatment Management Note 21-25    Patient:  Jhonny Mcmanus  Age:  80year old  Visit Diagnosis:    1.  Primary cancer of skin of left lower leg      Primary Rad/Onc:  Dr. Faraz Guadarrama    Site Delivered Dose (Gy)

## 2018-12-28 PROCEDURE — 77336 RADIATION PHYSICS CONSULT: CPT | Performed by: RADIOLOGY

## 2018-12-28 PROCEDURE — 77412 RADIATION TX DELIVERY LVL 3: CPT | Performed by: RADIOLOGY

## 2018-12-31 ENCOUNTER — PRIOR ORIGINAL RECORDS (OUTPATIENT)
Dept: OTHER | Age: 83
End: 2018-12-31

## 2019-01-09 RX ORDER — POTASSIUM CHLORIDE 1500 MG/1
TABLET, EXTENDED RELEASE ORAL
Qty: 90 TABLET | Refills: 0 | OUTPATIENT
Start: 2019-01-09

## 2019-01-09 RX ORDER — POTASSIUM CHLORIDE 1500 MG/1
TABLET, FILM COATED, EXTENDED RELEASE ORAL
Qty: 14 TABLET | Refills: 0 | Status: SHIPPED | OUTPATIENT
Start: 2019-01-09 | End: 2019-01-20

## 2019-01-09 NOTE — TELEPHONE ENCOUNTER
Needs to repeat labs and needs follow up with PCP per previous result notes and refill requests. Please call her to schedule.

## 2019-01-11 NOTE — PROGRESS NOTES
[facility]    PATIENT'S NAME: Ananth Rodas   RADIATION ONCOLOGIST: Cee Fernandez. Naomi Ordoñez M.D.    PATIENT ACCOUNT #: [de-identified] ILYAbee Villagomez   [facility]   MEDICAL RECORD #: [id number] YOB: 1927   DATE: 12/28/2018       RADIATION ONCOLOGY TR surrounding redness around the small treatment area, but otherwise tolerated well. She was quite diligent about utilizing her Aquaphor for moisturization, but otherwise completed therapy without any breakthrough interruptions in treatment.     FOLLOWUP AND

## 2019-01-21 RX ORDER — POTASSIUM CHLORIDE 1500 MG/1
TABLET, FILM COATED, EXTENDED RELEASE ORAL
Qty: 14 TABLET | Refills: 0 | Status: SHIPPED | OUTPATIENT
Start: 2019-01-21 | End: 2019-01-22

## 2019-01-21 NOTE — TELEPHONE ENCOUNTER
Rx Request  POTASSIUM CHLORIDE ER 20 MEQ Oral Tab CR    Disp:      14              R: 0    Last Visit: 08/20/2018    Last Refilled: 01/09/2018

## 2019-01-22 ENCOUNTER — OFFICE VISIT (OUTPATIENT)
Dept: FAMILY MEDICINE CLINIC | Facility: CLINIC | Age: 84
End: 2019-01-22
Payer: COMMERCIAL

## 2019-01-22 ENCOUNTER — APPOINTMENT (OUTPATIENT)
Dept: LAB | Age: 84
End: 2019-01-22
Attending: PHYSICIAN ASSISTANT
Payer: MEDICARE

## 2019-01-22 VITALS
HEART RATE: 88 BPM | HEIGHT: 61 IN | TEMPERATURE: 98 F | BODY MASS INDEX: 28.13 KG/M2 | WEIGHT: 149 LBS | DIASTOLIC BLOOD PRESSURE: 68 MMHG | SYSTOLIC BLOOD PRESSURE: 106 MMHG

## 2019-01-22 DIAGNOSIS — E87.6 DIURETIC-INDUCED HYPOKALEMIA: Primary | ICD-10-CM

## 2019-01-22 DIAGNOSIS — E87.6 LOW BLOOD POTASSIUM: ICD-10-CM

## 2019-01-22 DIAGNOSIS — T50.2X5A DIURETIC-INDUCED HYPOKALEMIA: Primary | ICD-10-CM

## 2019-01-22 LAB — POTASSIUM SERPL-SCNC: 3.3 MMOL/L (ref 3.6–5.1)

## 2019-01-22 PROCEDURE — 84132 ASSAY OF SERUM POTASSIUM: CPT

## 2019-01-22 PROCEDURE — 99213 OFFICE O/P EST LOW 20 MIN: CPT | Performed by: FAMILY MEDICINE

## 2019-01-22 PROCEDURE — 36415 COLL VENOUS BLD VENIPUNCTURE: CPT

## 2019-01-22 RX ORDER — POTASSIUM CHLORIDE 1500 MG/1
1 TABLET, FILM COATED, EXTENDED RELEASE ORAL
Qty: 14 TABLET | Refills: 0 | COMMUNITY
Start: 2019-01-22 | End: 2019-06-26 | Stop reason: ALTCHOICE

## 2019-01-22 NOTE — PATIENT INSTRUCTIONS
Stop triamterene.     Potassium only if taking furosemide    Blood pressure checks twice a day    Send them to me Friday and again in 1 week,    Await potassium level from today before giving any more potassium

## 2019-01-22 NOTE — PROGRESS NOTES
Was treated for hypokalemia back in September by Elizabeth Mckenzie. She does take triamterene daily. She takes furosemide very infrequently, in fact her daughter states only once in the last several months.   Potassium was found to be low again and she was prescribe surgery   • SKIN SURGERY  11/01/2011    SCCIS / L temple / bx by Dr. Kelli Collins / Levada Lanes by Dr. Zhanna Cruz   • SKIN SURGERY  02/26/2014    SCC to left forehead/ MMS     MEDICATIONS:    Current Outpatient Medications:  Potassium Chloride ER 2

## 2019-02-28 VITALS
DIASTOLIC BLOOD PRESSURE: 58 MMHG | BODY MASS INDEX: 28.52 KG/M2 | SYSTOLIC BLOOD PRESSURE: 110 MMHG | WEIGHT: 155 LBS | HEART RATE: 72 BPM | HEIGHT: 62 IN

## 2019-03-18 RX ORDER — TRIAMTERENE AND HYDROCHLOROTHIAZIDE 37.5; 25 MG/1; MG/1
1 TABLET ORAL DAILY
COMMUNITY
Start: 2018-10-08

## 2019-03-18 RX ORDER — SIMVASTATIN 20 MG
1 TABLET ORAL DAILY
COMMUNITY
Start: 2018-10-08

## 2019-03-18 RX ORDER — FUROSEMIDE 20 MG/1
1 TABLET ORAL
COMMUNITY
Start: 2018-10-09

## 2019-03-18 RX ORDER — POTASSIUM CHLORIDE 1500 MG/1
TABLET, EXTENDED RELEASE ORAL
COMMUNITY
Start: 2018-10-08

## 2019-06-19 ENCOUNTER — APPOINTMENT (OUTPATIENT)
Dept: CT IMAGING | Facility: HOSPITAL | Age: 84
DRG: 580 | End: 2019-06-19
Attending: PHYSICIAN ASSISTANT
Payer: MEDICARE

## 2019-06-19 ENCOUNTER — APPOINTMENT (OUTPATIENT)
Dept: GENERAL RADIOLOGY | Facility: HOSPITAL | Age: 84
DRG: 580 | End: 2019-06-19
Attending: PHYSICIAN ASSISTANT
Payer: MEDICARE

## 2019-06-19 ENCOUNTER — HOSPITAL ENCOUNTER (INPATIENT)
Facility: HOSPITAL | Age: 84
LOS: 2 days | Discharge: HOME HEALTH | DRG: 580 | End: 2019-06-21
Attending: EMERGENCY MEDICINE | Admitting: HOSPITALIST
Payer: MEDICARE

## 2019-06-19 DIAGNOSIS — R91.8 MASS OF LUNG: ICD-10-CM

## 2019-06-19 DIAGNOSIS — T79.6XXA TRAUMATIC RHABDOMYOLYSIS, INITIAL ENCOUNTER (HCC): Primary | ICD-10-CM

## 2019-06-19 DIAGNOSIS — L02.211 ABDOMINAL WALL ABSCESS: ICD-10-CM

## 2019-06-19 DIAGNOSIS — E78.00 PURE HYPERCHOLESTEROLEMIA: ICD-10-CM
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
## 2019-06-19 DIAGNOSIS — E87.6 HYPOKALEMIA: ICD-10-CM

## 2019-06-19 DIAGNOSIS — L02.91 ABSCESS: ICD-10-CM

## 2019-06-19 DIAGNOSIS — J93.9 PNEUMOTHORAX, UNSPECIFIED TYPE: ICD-10-CM

## 2019-06-19 PROCEDURE — 74176 CT ABD & PELVIS W/O CONTRAST: CPT | Performed by: PHYSICIAN ASSISTANT

## 2019-06-19 PROCEDURE — 71045 X-RAY EXAM CHEST 1 VIEW: CPT | Performed by: PHYSICIAN ASSISTANT

## 2019-06-19 PROCEDURE — 70450 CT HEAD/BRAIN W/O DYE: CPT | Performed by: PHYSICIAN ASSISTANT

## 2019-06-19 PROCEDURE — 99223 1ST HOSP IP/OBS HIGH 75: CPT | Performed by: HOSPITALIST

## 2019-06-19 PROCEDURE — 72125 CT NECK SPINE W/O DYE: CPT | Performed by: PHYSICIAN ASSISTANT

## 2019-06-19 RX ORDER — HEPARIN SODIUM 5000 [USP'U]/ML
5000 INJECTION, SOLUTION INTRAVENOUS; SUBCUTANEOUS EVERY 8 HOURS SCHEDULED
Status: DISCONTINUED | OUTPATIENT
Start: 2019-06-19 | End: 2019-06-20 | Stop reason: ALTCHOICE

## 2019-06-19 RX ORDER — HYDROMORPHONE HYDROCHLORIDE 1 MG/ML
0.5 INJECTION, SOLUTION INTRAMUSCULAR; INTRAVENOUS; SUBCUTANEOUS EVERY 30 MIN PRN
Status: ACTIVE | OUTPATIENT
Start: 2019-06-19 | End: 2019-06-19

## 2019-06-19 RX ORDER — METOCLOPRAMIDE HYDROCHLORIDE 5 MG/ML
5 INJECTION INTRAMUSCULAR; INTRAVENOUS EVERY 8 HOURS PRN
Status: DISCONTINUED | OUTPATIENT
Start: 2019-06-19 | End: 2019-06-21

## 2019-06-19 RX ORDER — ATORVASTATIN CALCIUM 10 MG/1
10 TABLET, FILM COATED ORAL NIGHTLY
Status: DISCONTINUED | OUTPATIENT
Start: 2019-06-19 | End: 2019-06-21

## 2019-06-19 RX ORDER — ONDANSETRON 2 MG/ML
4 INJECTION INTRAMUSCULAR; INTRAVENOUS EVERY 6 HOURS PRN
Status: DISCONTINUED | OUTPATIENT
Start: 2019-06-19 | End: 2019-06-21

## 2019-06-19 RX ORDER — ONDANSETRON 2 MG/ML
4 INJECTION INTRAMUSCULAR; INTRAVENOUS EVERY 4 HOURS PRN
Status: DISCONTINUED | OUTPATIENT
Start: 2019-06-19 | End: 2019-06-19

## 2019-06-19 RX ORDER — BISACODYL 10 MG
10 SUPPOSITORY, RECTAL RECTAL
Status: DISCONTINUED | OUTPATIENT
Start: 2019-06-19 | End: 2019-06-21

## 2019-06-19 RX ORDER — TRIAMTERENE AND HYDROCHLOROTHIAZIDE 37.5; 25 MG/1; MG/1
1 TABLET ORAL
Status: ON HOLD | COMMUNITY
Start: 2018-10-08 | End: 2019-06-19

## 2019-06-19 RX ORDER — POLYETHYLENE GLYCOL 3350 17 G/17G
17 POWDER, FOR SOLUTION ORAL DAILY PRN
Status: DISCONTINUED | OUTPATIENT
Start: 2019-06-19 | End: 2019-06-21

## 2019-06-19 RX ORDER — SODIUM CHLORIDE 9 MG/ML
INJECTION, SOLUTION INTRAVENOUS CONTINUOUS
Status: DISCONTINUED | OUTPATIENT
Start: 2019-06-19 | End: 2019-06-21

## 2019-06-19 RX ORDER — ACETAMINOPHEN 325 MG/1
650 TABLET ORAL EVERY 6 HOURS PRN
Status: DISCONTINUED | OUTPATIENT
Start: 2019-06-19 | End: 2019-06-21

## 2019-06-19 NOTE — H&P
[facility]  History and Physical     [facility] Patient Status:  Emergency    1927 MRN [id number]   Location [address] [facility] Attending Jaylin Yost MD   Hosp Day # 0 PCP Marcus Daigle MD     Chief Complaint: Clara Hernandezs Flakita Adame MD at [facility] MAIN OR   • TOM NEEDLE LOCALIZATION W/ SPECIMEN 1 SITE RIGHT      2004 bn   • REMOVAL GALLBLADDER     • SKIN SURGERY  12/9/09    BCC ulcerated to R nose / Mohs surgery   • SKIN SURGERY  8-12-10    BCC-nodular to right nose/ MOHs surgery   • S rhonchi. Cardiovascular: S1, S2. Regular rate and rhythm. No murmurs, rubs or gallops. Equal pulses. Chest and Back: No tenderness or deformity. Abdomen: Soft, nontender, nondistended. Positive bowel sounds. No rebound, guarding or organomegaly.   Kofi Celis

## 2019-06-19 NOTE — ED PROVIDER NOTES
Patient Seen in: [facility] Emergency Department    History   Patient presents with:  Trauma (cardiovascular, musculoskeletal)    Stated Complaint: fall    HPI  CHIEF COMPLAINT: Fall unknown downtime    HISTORY OF PRESENT ILLNESS: Patient is a pleas history elements is as listed in today's nurse's notes are reviewed and agree. The patient's family history is reviewed and is noncontributory to the presenting problem, except as indicated as above.       Past Medical History:   Diagnosis Date   • Actinic signs reviewed. All other systems reviewed and negative except as noted above.     Physical Exam     ED Triage Vitals [06/19/19 1459]   /67   Pulse 89   Resp 18   Temp 97.8 °F (36.6 °C)   Temp src Temporal   SpO2 98 %   O2 Device None (Room air) and equal bilaterally. Bilateral hips, ankle and feet are atraumatic, nontender. Remaining extremities are atraumatic, non tender to palpation and there is full range of motion of the joints.     ED Course     Labs Reviewed   COMP METABOLIC PANEL (14) - A normal renal functions, patient's troponin was unremarkable. Ct Brain Or Head (75859)    Result Date: 6/19/2019  CONCLUSION:  No evidence for acute bleed. No CT features for acute territorial infarct.   Chronic brain changes not uncommon for the age of 80 the diagnosis and admission for further evaluation and care. Patient states they understand diagnosis and admission and agree with admission and plan. I answered all of the patient's questions prior to admission.     Admission disposition: 6/19/2019  5:14 P

## 2019-06-19 NOTE — ED INITIAL ASSESSMENT (HPI)
Pt here due to being found on the floor of her house by family. Not sure how she ended up on the floor or how long she was there. Per grandson the family last seen her on Monday.

## 2019-06-19 NOTE — ED PROVIDER NOTES
41-year-old female presents to the emergency department after being found on the ground. Patient was seen by me as well by the SHANNON. It is unknown exactly how long she was down on the ground. Patient has no independent recollection of what occurred.   She

## 2019-06-20 ENCOUNTER — ANESTHESIA EVENT (OUTPATIENT)
Dept: SURGERY | Facility: HOSPITAL | Age: 84
DRG: 580 | End: 2019-06-20
Payer: MEDICARE

## 2019-06-20 ENCOUNTER — APPOINTMENT (OUTPATIENT)
Dept: CT IMAGING | Facility: HOSPITAL | Age: 84
DRG: 580 | End: 2019-06-20
Attending: INTERNAL MEDICINE
Payer: MEDICARE

## 2019-06-20 ENCOUNTER — ANESTHESIA (OUTPATIENT)
Dept: SURGERY | Facility: HOSPITAL | Age: 84
DRG: 580 | End: 2019-06-20
Payer: MEDICARE

## 2019-06-20 ENCOUNTER — APPOINTMENT (OUTPATIENT)
Dept: GENERAL RADIOLOGY | Facility: HOSPITAL | Age: 84
DRG: 580 | End: 2019-06-20
Attending: INTERNAL MEDICINE
Payer: MEDICARE

## 2019-06-20 PROCEDURE — 71250 CT THORAX DX C-: CPT | Performed by: INTERNAL MEDICINE

## 2019-06-20 PROCEDURE — 71045 X-RAY EXAM CHEST 1 VIEW: CPT | Performed by: INTERNAL MEDICINE

## 2019-06-20 PROCEDURE — 11043 DBRDMT MUSC&/FSCA 1ST 20/<: CPT | Performed by: SURGERY

## 2019-06-20 PROCEDURE — 99223 1ST HOSP IP/OBS HIGH 75: CPT | Performed by: SURGERY

## 2019-06-20 PROCEDURE — 0KBK0ZZ EXCISION OF RIGHT ABDOMEN MUSCLE, OPEN APPROACH: ICD-10-PCS | Performed by: SURGERY

## 2019-06-20 PROCEDURE — 99232 SBSQ HOSP IP/OBS MODERATE 35: CPT | Performed by: HOSPITALIST

## 2019-06-20 RX ORDER — POTASSIUM CHLORIDE 14.9 MG/ML
20 INJECTION INTRAVENOUS ONCE
Status: COMPLETED | OUTPATIENT
Start: 2019-06-20 | End: 2019-06-20

## 2019-06-20 RX ORDER — ENOXAPARIN SODIUM 100 MG/ML
40 INJECTION SUBCUTANEOUS DAILY
Status: DISCONTINUED | OUTPATIENT
Start: 2019-06-21 | End: 2019-06-21

## 2019-06-20 RX ORDER — HYDROCODONE BITARTRATE AND ACETAMINOPHEN 5; 325 MG/1; MG/1
2 TABLET ORAL AS NEEDED
Status: DISCONTINUED | OUTPATIENT
Start: 2019-06-20 | End: 2019-06-20 | Stop reason: HOSPADM

## 2019-06-20 RX ORDER — ONDANSETRON 2 MG/ML
4 INJECTION INTRAMUSCULAR; INTRAVENOUS EVERY 4 HOURS PRN
Status: DISCONTINUED | OUTPATIENT
Start: 2019-06-20 | End: 2019-06-21

## 2019-06-20 RX ORDER — DEXTROSE, SODIUM CHLORIDE, SODIUM LACTATE, POTASSIUM CHLORIDE, AND CALCIUM CHLORIDE 5; .6; .31; .03; .02 G/100ML; G/100ML; G/100ML; G/100ML; G/100ML
INJECTION, SOLUTION INTRAVENOUS CONTINUOUS
Status: DISCONTINUED | OUTPATIENT
Start: 2019-06-20 | End: 2019-06-21

## 2019-06-20 RX ORDER — BUPIVACAINE HYDROCHLORIDE 5 MG/ML
INJECTION, SOLUTION EPIDURAL; INTRACAUDAL AS NEEDED
Status: DISCONTINUED | OUTPATIENT
Start: 2019-06-20 | End: 2019-06-20 | Stop reason: HOSPADM

## 2019-06-20 RX ORDER — HYDROMORPHONE HYDROCHLORIDE 1 MG/ML
0.4 INJECTION, SOLUTION INTRAMUSCULAR; INTRAVENOUS; SUBCUTANEOUS EVERY 2 HOUR PRN
Status: DISCONTINUED | OUTPATIENT
Start: 2019-06-20 | End: 2019-06-21

## 2019-06-20 RX ORDER — HYDROMORPHONE HYDROCHLORIDE 1 MG/ML
0.2 INJECTION, SOLUTION INTRAMUSCULAR; INTRAVENOUS; SUBCUTANEOUS EVERY 2 HOUR PRN
Status: DISCONTINUED | OUTPATIENT
Start: 2019-06-20 | End: 2019-06-21

## 2019-06-20 RX ORDER — METOCLOPRAMIDE HYDROCHLORIDE 5 MG/ML
10 INJECTION INTRAMUSCULAR; INTRAVENOUS AS NEEDED
Status: DISCONTINUED | OUTPATIENT
Start: 2019-06-20 | End: 2019-06-20 | Stop reason: HOSPADM

## 2019-06-20 RX ORDER — IBUPROFEN 600 MG/1
600 TABLET ORAL ONCE AS NEEDED
Status: DISCONTINUED | OUTPATIENT
Start: 2019-06-20 | End: 2019-06-20 | Stop reason: HOSPADM

## 2019-06-20 RX ORDER — NALOXONE HYDROCHLORIDE 0.4 MG/ML
80 INJECTION, SOLUTION INTRAMUSCULAR; INTRAVENOUS; SUBCUTANEOUS AS NEEDED
Status: DISCONTINUED | OUTPATIENT
Start: 2019-06-20 | End: 2019-06-20 | Stop reason: HOSPADM

## 2019-06-20 RX ORDER — LABETALOL HYDROCHLORIDE 5 MG/ML
5 INJECTION, SOLUTION INTRAVENOUS EVERY 5 MIN PRN
Status: DISCONTINUED | OUTPATIENT
Start: 2019-06-20 | End: 2019-06-20 | Stop reason: HOSPADM

## 2019-06-20 RX ORDER — HYDROCODONE BITARTRATE AND ACETAMINOPHEN 5; 325 MG/1; MG/1
1 TABLET ORAL AS NEEDED
Status: DISCONTINUED | OUTPATIENT
Start: 2019-06-20 | End: 2019-06-20 | Stop reason: HOSPADM

## 2019-06-20 RX ORDER — BACITRACIN 50000 [USP'U]/1
INJECTION, POWDER, LYOPHILIZED, FOR SOLUTION INTRAMUSCULAR AS NEEDED
Status: DISCONTINUED | OUTPATIENT
Start: 2019-06-20 | End: 2019-06-20 | Stop reason: HOSPADM

## 2019-06-20 RX ORDER — SODIUM CHLORIDE, SODIUM LACTATE, POTASSIUM CHLORIDE, CALCIUM CHLORIDE 600; 310; 30; 20 MG/100ML; MG/100ML; MG/100ML; MG/100ML
INJECTION, SOLUTION INTRAVENOUS CONTINUOUS
Status: DISCONTINUED | OUTPATIENT
Start: 2019-06-20 | End: 2019-06-20 | Stop reason: HOSPADM

## 2019-06-20 RX ORDER — HYDROMORPHONE HYDROCHLORIDE 1 MG/ML
0.4 INJECTION, SOLUTION INTRAMUSCULAR; INTRAVENOUS; SUBCUTANEOUS EVERY 5 MIN PRN
Status: DISCONTINUED | OUTPATIENT
Start: 2019-06-20 | End: 2019-06-20 | Stop reason: HOSPADM

## 2019-06-20 RX ORDER — DEXAMETHASONE SODIUM PHOSPHATE 4 MG/ML
4 VIAL (ML) INJECTION AS NEEDED
Status: DISCONTINUED | OUTPATIENT
Start: 2019-06-20 | End: 2019-06-20 | Stop reason: HOSPADM

## 2019-06-20 RX ORDER — ONDANSETRON 2 MG/ML
4 INJECTION INTRAMUSCULAR; INTRAVENOUS AS NEEDED
Status: DISCONTINUED | OUTPATIENT
Start: 2019-06-20 | End: 2019-06-20 | Stop reason: HOSPADM

## 2019-06-20 RX ORDER — HYDROMORPHONE HYDROCHLORIDE 1 MG/ML
0.8 INJECTION, SOLUTION INTRAMUSCULAR; INTRAVENOUS; SUBCUTANEOUS EVERY 2 HOUR PRN
Status: DISCONTINUED | OUTPATIENT
Start: 2019-06-20 | End: 2019-06-21

## 2019-06-20 NOTE — PROGRESS NOTES
06/20/19 5676   Clinical Encounter Type   Visited With Patient not available  (PT currently having a procedure)   Routine Visit Introduction  (POLST requested.   RN reports pt is decisional)

## 2019-06-20 NOTE — PAYOR COMM NOTE
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 No periorbital edema, ecchymosis or bony tenderness present. Respiratory: chest is non tender to palpation, breath sounds are equal without wheezing, rhonchi or rales. No crepitus.     Cardiac: regular rate and for the following components:    CK 3,987 (*)     All other components within normal limits   CBC W/ DIFFERENTIAL - Abnormal; Notable for the following components:    WBC 12.4 (*)     RDW-SD 50.8 (*)     Neutrophil Absolute Prelim 10.53 (*)     Neutrophil 6/19/2019  CONCLUSION:  Degenerative changes in the spine not uncommon for the age of 80. No sign of fracture, prevertebral swelling or traumatic subluxation.    Dictated by: Prabha Pimentel MD on 6/19/2019 at 16:41     Approved by: Prabha Pimentel MD She had CT scan of her head neck and abdomen. She was found to have a new chest mass. There was evidence of rhabdomyolysis. I reviewed the findings with the patient's daughter who is power of .   She states that she has reviewed advanced directiv intact. Musculoskeletal: Moves all extremities. Extremities: No edema or cyanosis. Integument: No rashes or lesions. Psychiatric: Appropriate mood and affect.       Diagnostic Data:      Labs:  Recent Labs   Lab 06/19/19  1524   WBC 12.4*   HGB 14.2 Sensitivity decreased without IV contrast.  2. Large left upper lobe lung mass which extends to the hilum. There is a mass also in the AP window, likely extension of tumor rather than an enlarged lymph node.   The central left upper lobe bronchus appears n Procedure(s):  IRRIGATION AND DEBRIDEMENT RIGHT LOWER QUADRANT ABSCESS     Surgeon(s) and Role:     * Edy Zamudio MD - Primary     Assistant(s):        Surgical Findings: Complex SSSI of RLQ     Specimen: Tissue for cx     Estimated Blood Loss: 2

## 2019-06-20 NOTE — PROGRESS NOTES
[facility] Pharmacy Note:  Renal Dose Adjustment for Metoclopramide (REGLAN)    Dorothy Canas has been prescribed Metoclopramide (REGLAN) 10 mg every 8 hours as needed for nausea, vomiting. Estimated Crcl for age > 70 yrs using Scr 0.85 was ~ 37 ml/min.      H

## 2019-06-20 NOTE — ANESTHESIA POSTPROCEDURE EVALUATION
[address] [place] [address] Patient Status:  Inpatient   Age/Gender 80year old female MRN [id number]   Location [facility] Attending Lionel Javier, [address] [facility] Day # 1 PCP Malathi Azevedo MD       Anesthesia Post-op Note

## 2019-06-20 NOTE — PLAN OF CARE
NURSING ADMISSION NOTE      Patient admitted via cart. Oriented to room. Safety precautions initiated. Bed in low position. Call light in reach. Received pt at 1900. Denies pain/discomfort. Pt does not remember how/when she fell.    Bruising n

## 2019-06-20 NOTE — OPERATIVE REPORT
[facility]    PATIENT'S NAME: Urban Rosales   ATTENDING PHYSICIAN: Estefania Clemons. Loyda Gerard   OPERATING PHYSICIAN: Zaid Saldana M.D.    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 Risks, benefits, and alternatives of local irrigation and sharp debridement of the right lower quadrant were explained to the patient and her family in detail.   Risks explained included but antibiotic-impregnated saline solution. Once this was completed, wet-to-dry dressings were placed. The patient was then awakened from anesthesia. She was brought to the recovery room in stable condition.   She tolerated the procedure without apparent com

## 2019-06-20 NOTE — CONSULTS
[facility]  Report of Consultation    Yong Duong Patient Status:  Inpatient    1927 MRN [id number]   [facility] 7NE-A Attending Dawn King, [address] [facility] Day # 1 PCP Geovanny Johnston MD     Reason for Consultation:  RLQ abscess, patient's daughter states the wound completely healed with regular wet to dry dressing changes. They had not noticed a recurrence until now. The patient denies fever or chills. No changes to her bowel habits. No altered appetite or nausea.     Other medi 8-12-10    BCC-nodular to right nose/ MOHs surgery   • SKIN SURGERY  11/01/2011    SCCIS / L temple / bx by Dr. Noemi Echeverria / Gwendolyn Kaur by Dr. Matthew Freitas   • SKIN SURGERY  02/26/2014    SCC to left forehead/ MMS     Family History   Problem Rela for abnormal sleep patterns, increased activity, polydipsia and polyphagia  ENMT:  Negative for ear drainage, hearing loss and nasal drainage  Eyes:  Negative for eye discharge and vision loss  Gastrointestinal:  Pos for abdominal pain, neg for constipatio CREATSERUM 0.62 0.48*   GFRAA 91 98   GFRNAA 79 85   CA 9.1 8.0*   ALB 3.3* 2.7*    141   K 3.2* 2.8*    108   CO2 27.0 24.0   ALKPHO 74 66   AST 78* 74*   ALT 24 19   BILT 0.9 0.9   TP 7.4 6.0*         No results for input(s): PTP, INR, PTT cm.     Normal appearance of the spleen, adrenal glands, and pancreas. Cholecystectomy, and chronic compensatory dilation of the biliary tract stable from prior.      Slight medial and anterior displacement of the right kidney, secondary to mass effect upo The patient had abnormal chest x-ray. FINDINGS:  Sensitivity is decreased without IV contrast.  LUNGS:  There is a mass in the medial left upper lobe, which extends to the hilum. This is measured at 8.4 x 4.8 cm. There are calcifications within. Impression:  Patient Active Problem List:     Basal cell carcinoma     Squamous cell carcinoma in situ     Essential hypertension     Osteoporosis     Hyperlipidemia     Atherosclerosis of aorta (HCC)     Adrenal nodule (HCC)     Ectatic abdominal ao

## 2019-06-20 NOTE — PROGRESS NOTES
[facility]  Progress Note     Georginalakhwinder Molinaaj Patient Status:  Inpatient    1927 MRN [id number]   [facility] 7NE-A Attending Trudy Campbell, [address] [facility] Day # 1 PCP mEber Amador MD     Chief Complaint: Fall    S: Patient seen an piperacillin-tazobactam  3.375 g Intravenous Q8H   • atorvastatin  10 mg Oral Nightly   • Heparin Sodium (Porcine)  5,000 Units Subcutaneous Q8H [address]       ASSESSMENT / PLAN:     1. New hilar mass with associated effusion and possible pneumothorax  1.  CT ch

## 2019-06-20 NOTE — PROGRESS NOTES
[facility] Pharmacy Note:  Renal Adjustment for cefazolin (ANCEF)    Dorothy Canas is a 80year old female who has been prescribed cefazolin (ANCEF) 1g every 8 hrs. Estimated Crcl for age > 70 yrs using Scr 0.85 was ~ 37 ml/min.   so the dose has been adjusted to

## 2019-06-20 NOTE — CONSULTS
Pulmonary / Critical Care H&P/Consult       NAME: Nancy [address]: 40/0522-I - MRN: [id number] - Age: 80year old - :  1927    Date of Admission: 2019  2:49 PM  Admission Diagnosis: Hypokalemia [E87.6]  Abdominal wall abscess [L02.211] ENDOSCOPY   • LAPAROSCOPIC COLON RESECTION - RIGHT Right 6/6/2014    Performed by Jhonny Grant MD at [address] [facility] [address]   • TOM NEEDLE LOCALIZATION W/ SPECIMEN 1 SITE RIGHT      2004 bn   • REMOVAL GALLBLADDER     • SKIN SURGERY  12/9/09    BCC ulcerated to R nose / of current or ex partner: Not on file        Emotionally abused: Not on file        Physically abused: Not on file        Forced sexual activity: Not on file    Other Topics      Concerns:         Service: Not Asked        Blood Transfusions: Not A Encounters:  06/19/19 : 150 lb (68 kg)  01/22/19 : 149 lb (67.6 kg)  12/27/18 : 160 lb (72.6 kg)        Intake/Output Summary (Last 24 hours) at 6/20/2019 0753  Last data filed at 6/20/2019 2337  Gross per 24 hour   Intake 1113 ml   Output —   Net 1113 ml mass, seen in 2014, slightly bigger now  Ct head / cspine: no acute findings. ASSESSMENT/PLAN:    1. Pneumothorax: tiny foci of gas within L pleural effusion on ct abd.   Not apparent on admit cxr.   - will repeat cxr now to ensure there hasn't been sig

## 2019-06-20 NOTE — ANESTHESIA PREPROCEDURE EVALUATION
PRE-OP EVALUATION    Patient Name: Shar Estrada    Pre-op Diagnosis: Abscess [L02.91]    Procedure(s):  IRRIGATION AND DEBRIDEMENT RIGHT LOWER QUADRANT ABSCESS    Surgeon(s) and Role:     * Dana Allen MD - Primary    Pre-op vitals reviewed.   Temp Cardiovascular      ECG reviewed. (+) hypertension   (+) hyperlipidemia                                  Endo/Other    Negative endo/other ROS. Pulmonary    Negative pulmonary ROS. 06/20/2019    BUN 18 06/20/2019    CREATSERUM 0.48 (L) 06/20/2019    GLU 86 06/20/2019    CA 8.0 (L) 06/20/2019            Airway      Mallampati: II       Cardiovascular    Cardiovascular exam normal.         Dental    No notable dental history.          P

## 2019-06-20 NOTE — BRIEF OP NOTE
Pre-Operative Diagnosis: Abscess [L02.91]     Post-Operative Diagnosis: Abscess [L02.91]      Procedure Performed:   Procedure(s):  IRRIGATION AND DEBRIDEMENT RIGHT LOWER QUADRANT ABSCESS    Surgeon(s) and Role:     * Regan Zamudio MD - Primary    Ass

## 2019-06-20 NOTE — PLAN OF CARE
Received pt a/o4, RA, NSR on tele at 0700  Pt denies any pain at this time  Pt had CXR and CT  General Surgery came to look at wound on RLQ, ended up taking pt down to surgery for Debridement of wound,   Pt updated on plan of care and will continue to [facility] to functional status, cognitive ability or social support system  Outcome: Progressing

## 2019-06-21 ENCOUNTER — TELEPHONE (OUTPATIENT)
Dept: FAMILY MEDICINE CLINIC | Facility: CLINIC | Age: 84
End: 2019-06-21

## 2019-06-21 VITALS
RESPIRATION RATE: 18 BRPM | HEART RATE: 84 BPM | WEIGHT: 150 LBS | SYSTOLIC BLOOD PRESSURE: 121 MMHG | OXYGEN SATURATION: 97 % | BODY MASS INDEX: 28 KG/M2 | DIASTOLIC BLOOD PRESSURE: 65 MMHG | TEMPERATURE: 98 F

## 2019-06-21 PROCEDURE — 99239 HOSP IP/OBS DSCHRG MGMT >30: CPT | Performed by: HOSPITALIST

## 2019-06-21 RX ORDER — AMOXICILLIN AND CLAVULANATE POTASSIUM 875; 125 MG/1; MG/1
1 TABLET, FILM COATED ORAL 2 TIMES DAILY
Qty: 20 TABLET | Refills: 0 | Status: SHIPPED | OUTPATIENT
Start: 2019-06-21 | End: 2019-07-01

## 2019-06-21 RX ORDER — POTASSIUM CHLORIDE 20 MEQ/1
40 TABLET, EXTENDED RELEASE ORAL ONCE
Status: DISCONTINUED | OUTPATIENT
Start: 2019-06-21 | End: 2019-06-21

## 2019-06-21 RX ORDER — SIMVASTATIN 10 MG
10 TABLET ORAL NIGHTLY
Qty: 90 TABLET | Refills: 3 | Status: SHIPPED | COMMUNITY
Start: 2019-06-21 | End: 2019-09-16

## 2019-06-21 NOTE — PLAN OF CARE
Assumed care at [address] and oriented x4  Facial redness and bruising noted  IV abx infusing  Abd dressing c/d/I  Family at bedside  Plan of care discussed  Call light in reach            Problem: RISK FOR INFECTION - ADULT  Goal: Absence of fever/infec system  Outcome: Progressing

## 2019-06-21 NOTE — CONSULTS
[facility]  Inpatient Wound Care Contact Note    Olena Louis Patient Status:  Inpatient    1927 MRN [id number]   [facility] 7NE-A Attending Dilcia Carlisle, [address] [facility] Day # 2 PCP Naveen John MD     Surgical wound dressings ha

## 2019-06-21 NOTE — PROGRESS NOTES
06/21/19 0958   Clinical Encounter Type   Visited With Health care provider;Patient not available   Routine Visit Follow-up  (Responded to the POLST consult)   Continue Visiting   (Encouraged to call  anytime  for further support)   Checked in w

## 2019-06-21 NOTE — PHYSICAL THERAPY NOTE
PHYSICAL THERAPY EVALUATION - INPATIENT     Room Number: 0323/8325-Y  Evaluation Date: 6/21/2019  Type of Evaluation: Initial  Physician Order: PT Eval and Treat    Presenting Problem: fall  Reason for Therapy: Mobility Dysfunction and Discharge Plan RESECTION - RIGHT Right 6/6/2014    Performed by Shravan Pastrana MD at [address]   • TOM NEEDLE LOCALIZATION W/ SPECIMEN 1 SITE RIGHT      2004 bn   • REMOVAL GALLBLADDER     • SKIN SURGERY  12/9/09    BCC ulcerated to R nose / Mohs surgery   • SKIN SURGERY  8- (e.g., wheelchair, bedside commode, etc.): A Little   -   Moving from lying on back to sitting on the side of the bed?: None   How much help from another person does the patient currently need. ..   -   Moving to and from a bed to a chair (including a wheel evaluation, patient's clinical presentation is stable and overall the evaluation complexity is considered low. These impairments and comorbidities manifest themselves as functional limitations in independent bed mobility, transfers, and gait.   The patient

## 2019-06-21 NOTE — DISCHARGE SUMMARY
[facility] PSY[facility]IATRIC [place] HOSPITALIST  DISCHARGE SUMMARY     Hiram Kathleen Patient Status:  Inpatient    1927 MRN [id number]   [facility] 7NE-A Attending Augusto Santillan, [address] [facility] Day # 2 PCP Karrie Iglseias MD     Date of Admission: 2019  Date of D · None    Consultants:  • General surgery  · Pulmonary     Discharge Medication List:     Discharge Medications      START taking these medications      Instructions Prescription details   Amoxicillin-Pot Clavulanate 875-125 MG Tabs  Commonly known as:

## 2019-06-21 NOTE — PROGRESS NOTES
[facility] HOSPITAL[facility]  Progress Note     [facility] Record Patient Status:  Inpatient    1927 MRN [id number]   [facility] 7NE-A Attending Derik Meneses, [address] [facility] Day # 2 PCP Dylan Cuellar MD     Chief Complaint: Fall    S: Patient seen an Epic.    Medications:   • piperacillin-tazobactam  3.375 g Intravenous Q8H   • enoxaparin  40 mg Subcutaneous Daily   • atorvastatin  10 mg Oral Nightly       ASSESSMENT / PLAN:     1. New hilar mass with associated effusion and possible pneumothorax  1.  C

## 2019-06-21 NOTE — PROGRESS NOTES
[facility]  Progress Note    Olena Louis Patient Status:  Inpatient    1927 MRN [id number]   [facility] 7NE-A Attending Dilcia Carlisle, [address] Day # 2 PCP Naveen John MD     Subjective:  Pt sitting up in chair, just finis daughter is a nurse and is familiar with these dressings   2. IV zosyn - transition to augmentin on DC   3. Suitable for DC home today from general surgical standpoint   4. Pt to follow up in our office in 1 week  5.  DVT prophylaxis  6. GI prophylaxis    K

## 2019-06-21 NOTE — CM/SW NOTE
Pt is a 81 yo admitted for rhabdomyolysis. Pt lives alone and has been independent for her adls. Pt has a lung mass. Pt may need IV ABX. PT thought pt could benefit from HHPT. Carmela Gregory from [facility] saw pt and she is agreeable to [facility] for RN & PT.   H

## 2019-06-21 NOTE — HOME CARE LIAISON
MET WITH PTNT AND OFFERED CHOICE  OF AGENCIES. PTNT AGREEABLE TO [facility]. MET WITH PTNT TO DISCUSS HOME HEALTH SERVICES AND COVERAGE CRITERIA. PTNT AGREEABLE TO [facility]. PTNT GIVEN RESIDENTIAL BROCHURE.  RESIDENTIAL WITH PROVIDE SN/PT ON DISC

## 2019-06-21 NOTE — PROGRESS NOTES
[address] [place] [address] Patient Status:  Inpatient    1927 MRN [id number]   [facility] 7NE-A Attending Que Gunn, [address] [facility] Day # 2 PCP Mai Orlando MD     Pulm / Critical Care Progress Note     S: underwent I+D of RLQ the last 168 hours.       Recent Labs   Lab 06/19/19  1523 06/20/19  0554 06/20/19  1454    141  --    K 3.2* 2.8* 3.7    108  --    CO2 27.0 24.0  --    BUN 17 18  --      CREATININE (mg/dL)   Date Value   08/04/2014 0.40   08/03/2014 0.30 (L)

## 2019-06-21 NOTE — PLAN OF CARE
NURSING DISCHARGE NOTE    Discharged Home via Wheelchair. Accompanied by Family member  Belongings Taken by patient/family.     Received pt a/ox4, RA, NSR on tele at 0700  Pt denies any pain at this time  General Surgery changed dressing on wound  Pt a preferences of patient/family/discharge partner  - Complete POLST form as appropriate  - Assess patient's ability to be responsible for managing their own health  - Refer to Case Management Department for coordinating discharge planning if the patient need

## 2019-06-21 NOTE — TELEPHONE ENCOUNTER
Jn Lu from [facility] is requesting to ask Dr. Kamini Power if he could please sign order for [facility] PT and Nursing after immediate hospital discharge. Pt might be discharged tomorrow. Please call and advise.

## 2019-06-24 ENCOUNTER — TELEPHONE (OUTPATIENT)
Dept: FAMILY MEDICINE CLINIC | Facility: CLINIC | Age: 84
End: 2019-06-24

## 2019-06-24 ENCOUNTER — PATIENT OUTREACH (OUTPATIENT)
Dept: CASE MANAGEMENT | Age: 84
End: 2019-06-24

## 2019-06-24 DIAGNOSIS — Z02.9 ENCOUNTERS FOR UNSPECIFIED ADMINISTRATIVE PURPOSE: ICD-10-CM

## 2019-06-24 DIAGNOSIS — L02.211 ABDOMINAL WALL ABSCESS: ICD-10-CM

## 2019-06-24 PROCEDURE — 1111F DSCHRG MED/CURRENT MED MERGE: CPT

## 2019-06-24 NOTE — TELEPHONE ENCOUNTER
ADRIÁN spoke with Marly Trejo pt daughter for TCM. Marly Trejo did not schedule a TCM HFU at this time and pt is moderate risk for readmission. Please f/up and try to schedule by recommended appt date of 7/5/19 as pt would greatly benefit. Thank you!

## 2019-06-24 NOTE — PROGRESS NOTES
Initial Post Discharge Follow Up   Discharge Date: 6/21/19  Contact Date: 6/24/2019    Consent Verification:  Assessment Completed With: Lorre Gowers, daughter  HIPAA Verified?   Yes    Discharge Dx:    RLQ abscess  Hilar mass  Rhabdomyolysis   H/o colon cancer 20 MEQ Oral Tab CR Take 1 tablet by mouth daily as needed (when taking furosemide only).  Disp: 14 tablet Rfl: 0     • When you were leaving the hospital were any medication changes discussed with you? yes  • If you were prescribed a new medication:   o Was For patients with TCC appointments:     NCM offered sooner TCC appointment if schedule allowed:  n/a    []  Advised patient to bring all medications and blood glucose meter/supplies if applicable

## 2019-06-25 ENCOUNTER — TELEPHONE (OUTPATIENT)
Dept: SURGERY | Facility: CLINIC | Age: 84
End: 2019-06-25

## 2019-06-25 ENCOUNTER — TELEPHONE (OUTPATIENT)
Dept: FAMILY MEDICINE CLINIC | Facility: CLINIC | Age: 84
End: 2019-06-25

## 2019-06-25 NOTE — TELEPHONE ENCOUNTER
Home [facility] wound care nurse would like to change the dressing to alginate 3 x weekly from wet to dry 2x daily. Family is having hard time achieving the daily dressing changes. Ok to try this with close evaluation.

## 2019-06-26 ENCOUNTER — ANESTHESIA EVENT (OUTPATIENT)
Dept: ENDOSCOPY | Facility: HOSPITAL | Age: 84
End: 2019-06-26

## 2019-06-26 NOTE — ANESTHESIA PREPROCEDURE EVALUATION
PRE-OP EVALUATION    Patient Name: Natalya Crow    Pre-op Diagnosis: ABNORMAL CT    Procedure(s):  BRONCHOSCOPY WITH BRONCHIAL ALVEOLAR LAVAGE    Surgeon(s) and Role:     Antonia Daniel MD - Primary    Pre-op vitals reviewed.         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                           Neuro/Psych    Negativ Component Value Date     06/20/2019    K 3.7 06/20/2019     06/20/2019    CO2 24.0 06/20/2019    BUN 18 06/20/2019    CREATSERUM 0.48 (L) 06/20/2019    GLU 86 06/20/2019    CA 8.0 (L) 06/20/2019            Airway      Mallampati: II  Mouth op

## 2019-06-26 NOTE — TELEPHONE ENCOUNTER
Patient last seen in January 2019. Therefore it would depend on the Medicare need for face-to-face encounter. I have not had a face-to-face encounter with her in almost 6 months. She has been hospitalized and is being hospitalized again tomorrow.

## 2019-06-27 ENCOUNTER — ANESTHESIA (OUTPATIENT)
Dept: ENDOSCOPY | Facility: HOSPITAL | Age: 84
End: 2019-06-27

## 2019-06-27 ENCOUNTER — HOSPITAL ENCOUNTER (OUTPATIENT)
Facility: HOSPITAL | Age: 84
Setting detail: HOSPITAL OUTPATIENT SURGERY
Discharge: HOME | End: 2019-06-27
Attending: INTERNAL MEDICINE | Admitting: INTERNAL MEDICINE
Payer: MEDICARE

## 2019-06-27 VITALS
TEMPERATURE: 98 F | WEIGHT: 150 LBS | SYSTOLIC BLOOD PRESSURE: 142 MMHG | RESPIRATION RATE: 18 BRPM | BODY MASS INDEX: 27.6 KG/M2 | HEART RATE: 70 BPM | OXYGEN SATURATION: 93 % | HEIGHT: 62 IN | DIASTOLIC BLOOD PRESSURE: 55 MMHG

## 2019-06-27 PROCEDURE — 87206 SMEAR FLUORESCENT/ACID STAI: CPT | Performed by: INTERNAL MEDICINE

## 2019-06-27 PROCEDURE — 88305 TISSUE EXAM BY PATHOLOGIST: CPT | Performed by: INTERNAL MEDICINE

## 2019-06-27 PROCEDURE — 87070 CULTURE OTHR SPECIMN AEROBIC: CPT | Performed by: INTERNAL MEDICINE

## 2019-06-27 PROCEDURE — 0B9G8ZX DRAINAGE OF LEFT UPPER LUNG LOBE, VIA NATURAL OR ARTIFICIAL OPENING ENDOSCOPIC, DIAGNOSTIC: ICD-10-PCS | Performed by: INTERNAL MEDICINE

## 2019-06-27 PROCEDURE — 87102 FUNGUS ISOLATION CULTURE: CPT | Performed by: INTERNAL MEDICINE

## 2019-06-27 PROCEDURE — 87205 SMEAR GRAM STAIN: CPT | Performed by: INTERNAL MEDICINE

## 2019-06-27 PROCEDURE — 87116 MYCOBACTERIA CULTURE: CPT | Performed by: INTERNAL MEDICINE

## 2019-06-27 PROCEDURE — 88104 CYTOPATH FL NONGYN SMEARS: CPT | Performed by: INTERNAL MEDICINE

## 2019-06-27 PROCEDURE — 0BDG8ZX EXTRACTION OF LEFT UPPER LUNG LOBE, VIA NATURAL OR ARTIFICIAL OPENING ENDOSCOPIC, DIAGNOSTIC: ICD-10-PCS | Performed by: INTERNAL MEDICINE

## 2019-06-27 RX ORDER — SODIUM CHLORIDE, SODIUM LACTATE, POTASSIUM CHLORIDE, CALCIUM CHLORIDE 600; 310; 30; 20 MG/100ML; MG/100ML; MG/100ML; MG/100ML
INJECTION, SOLUTION INTRAVENOUS CONTINUOUS
Status: DISCONTINUED | OUTPATIENT
Start: 2019-06-27 | End: 2019-06-27

## 2019-06-27 NOTE — ANESTHESIA POSTPROCEDURE EVALUATION
[address] [place] [address] Patient Status:  Hospital Outpatient Surgery   Age/Gender 80year old female MRN [id number]   Location [facility]. Attending Edmar Costa MD   Hosp Day # 0 PCP Ember Amador MD       Anesthesia Post-op N

## 2019-06-27 NOTE — OPERATIVE REPORT
Bronchoscopy procedure report    Preop diagnosis: abnl ct  Postop diagnosis:  same  Procedure performed: Bronchoscopy, Diagnostic  Brush biopsy    Sedation used:  MAC    Description of procedure: Informed consent was obtained.  Oxygen applied via nasal aria

## 2019-07-01 LAB — NON GYNE INTERPRETATION: NEGATIVE

## 2019-07-02 ENCOUNTER — OFFICE VISIT (OUTPATIENT)
Dept: SURGERY | Facility: CLINIC | Age: 84
End: 2019-07-02

## 2019-07-02 ENCOUNTER — TELEPHONE (OUTPATIENT)
Dept: FAMILY MEDICINE CLINIC | Facility: CLINIC | Age: 84
End: 2019-07-02

## 2019-07-02 VITALS
DIASTOLIC BLOOD PRESSURE: 74 MMHG | SYSTOLIC BLOOD PRESSURE: 122 MMHG | HEART RATE: 91 BPM | BODY MASS INDEX: 27 KG/M2 | TEMPERATURE: 98 F | WEIGHT: 149 LBS

## 2019-07-02 DIAGNOSIS — L02.211 ABDOMINAL WALL ABSCESS: Primary | ICD-10-CM

## 2019-07-02 PROCEDURE — 99024 POSTOP FOLLOW-UP VISIT: CPT | Performed by: PHYSICIAN ASSISTANT

## 2019-07-02 NOTE — PROGRESS NOTES
Follow Up Visit Note       Active Problems      1.  Abdominal wall abscess          Chief Complaint   Patient presents with:  Post-Op: NSM        History of Present Illness  The patient presents today for continued care and evaluation following irrigation d • CATARACT  2009    bilateral - no lens implants   • CHEMOSURG MOHS 1ST STAGE Left 02/2018    left side forehead, Dr Rickey Nunez   • [place]     • COLONOSCOPY N/A 6/5/2014    Performed by Zaire Headley MD at [facility] breath. Negative for apnea, cough and wheezing. Cardiovascular: Negative for chest pain, palpitations and leg swelling. Gastrointestinal: Positive for constipation.  Negative for abdominal distention, abdominal pain, anal bleeding, blood in stool, diar wound demonstrates granulation tissue at the base of the wound with small amounts of fibrous exudate. There is no surrounding erythema or palpable areas of fluctuance. There is no purulent drainage. Musculoskeletal: Normal range of motion.    Neurologic

## 2019-07-02 NOTE — TELEPHONE ENCOUNTER
[facility] wanted to let Anthony Matute know that patient has declined 2nd nursing visit. Daughter will be changing the dressing.

## 2019-07-09 NOTE — PROGRESS NOTES
Left message for patient and daughter Talon Bound ok per hippa to call the office to discuss test results.

## 2019-07-09 NOTE — PROGRESS NOTES
Please let pt's daughter know that bronchoscopy did not confirm infection or malignancy. For now no additional recs. We can plan a repeat cat scan if family would like, or schedule an appt with me to discuss further.

## 2019-07-16 ENCOUNTER — OFFICE VISIT (OUTPATIENT)
Dept: SURGERY | Facility: CLINIC | Age: 84
End: 2019-07-16

## 2019-07-16 VITALS
HEART RATE: 81 BPM | BODY MASS INDEX: 28.13 KG/M2 | TEMPERATURE: 98 F | WEIGHT: 149 LBS | SYSTOLIC BLOOD PRESSURE: 154 MMHG | DIASTOLIC BLOOD PRESSURE: 80 MMHG | HEIGHT: 61 IN

## 2019-07-16 DIAGNOSIS — L02.211 ABDOMINAL WALL ABSCESS: Primary | ICD-10-CM

## 2019-07-16 PROCEDURE — 99024 POSTOP FOLLOW-UP VISIT: CPT | Performed by: PHYSICIAN ASSISTANT

## 2019-07-16 NOTE — PROGRESS NOTES
Post Operative Visit Note       Active Problems  1. Abdominal wall abscess         Chief Complaint   Patient presents with:  Post-Op: 2 week cont.  care and treatment - P/O, 6/20 Irrigation and debridement of right lower quadrant abscess         History of INCISIONAL  2004    right   • BRONCHOSCOPY N/A 6/27/2019    Performed by Arlette Acevedo MD at [facility] ENDOSCOPY   • CATARACT  2009    bilateral - no lens implants   • [address] Left 02/2018    left side foreshaista, Dr Kumar Amoret for hearing loss, nosebleeds, sore throat and trouble swallowing. Respiratory: Negative for apnea, cough, shortness of breath and wheezing. Cardiovascular: Negative for chest pain, palpitations and leg swelling.    Gastrointestinal: Negative for abdom Normal range of motion. Neurological: She is alert and oriented to person, place, and time. Skin: Skin is warm and dry. No rash noted. She is not diaphoretic. Psychiatric: She has a normal mood and affect.  Her behavior is normal. Judgment and thought

## 2019-08-05 ENCOUNTER — TELEPHONE (OUTPATIENT)
Dept: FAMILY MEDICINE CLINIC | Facility: CLINIC | Age: 84
End: 2019-08-05

## 2019-08-05 NOTE — TELEPHONE ENCOUNTER
FYI-- Notified Cruz Jung [facility] nurse kelly to add on 2 more visits to prepare for discharge. States pt's abd wound is healing well.

## 2019-08-13 ENCOUNTER — OFFICE VISIT (OUTPATIENT)
Dept: SURGERY | Facility: CLINIC | Age: 84
End: 2019-08-13
Payer: COMMERCIAL

## 2019-08-13 VITALS
SYSTOLIC BLOOD PRESSURE: 121 MMHG | TEMPERATURE: 98 F | WEIGHT: 150 LBS | DIASTOLIC BLOOD PRESSURE: 73 MMHG | HEART RATE: 83 BPM | BODY MASS INDEX: 28 KG/M2

## 2019-08-13 DIAGNOSIS — L02.211 ABDOMINAL WALL ABSCESS: Primary | ICD-10-CM

## 2019-08-13 PROCEDURE — 99213 OFFICE O/P EST LOW 20 MIN: CPT | Performed by: PHYSICIAN ASSISTANT

## 2019-08-13 NOTE — PROGRESS NOTES
Post Operative Visit Note       Active Problems  1. Abdominal wall abscess         Chief Complaint   Patient presents with:  Post-Op: 4wk f/u P/O, 6/20 Irrigation and debridement of right lower quadrant abscess, pt's daughter states slight discharge.  no fe right   • BRONCHOSCOPY N/A 6/27/2019    Performed by Chavez Rodriguez MD at [facility] ENDOSCOPY   • CATARACT  2009    bilateral - no lens implants   • [address] Left 02/2018    left side forehead, Dr Day Vann   • [address] throat and trouble swallowing. Respiratory: Negative for apnea, cough, shortness of breath and wheezing. Cardiovascular: Negative for chest pain, palpitations and leg swelling.    Gastrointestinal: Negative for abdominal distention, abdominal pain, an The liver and spleen are nonpalpable. There are no palpable masses or hernias. The wound has completely healed to the level of the skin. It has completely closed at this time. There is no surrounding erythema or active drainage at this time.   There a

## 2019-09-16 DIAGNOSIS — E78.00 PURE HYPERCHOLESTEROLEMIA: ICD-10-CM

## 2019-09-16 RX ORDER — SIMVASTATIN 10 MG
10 TABLET ORAL NIGHTLY
Qty: 90 TABLET | Refills: 3 | Status: SHIPPED | OUTPATIENT
Start: 2019-09-16 | End: 2020-01-06

## 2019-10-03 ENCOUNTER — HOSPITAL ENCOUNTER (OUTPATIENT)
Dept: CT IMAGING | Age: 84
Discharge: HOME | End: 2019-10-03
Attending: INTERNAL MEDICINE
Payer: MEDICARE

## 2019-10-03 DIAGNOSIS — R91.8 LUNG MASS: ICD-10-CM

## 2019-10-03 PROCEDURE — 71250 CT THORAX DX C-: CPT | Performed by: INTERNAL MEDICINE

## 2019-12-24 ENCOUNTER — APPOINTMENT (OUTPATIENT)
Dept: ULTRASOUND IMAGING | Facility: HOSPITAL | Age: 84
End: 2019-12-24
Payer: MEDICARE

## 2019-12-24 ENCOUNTER — HOSPITAL ENCOUNTER (EMERGENCY)
Facility: HOSPITAL | Age: 84
Discharge: HOME | End: 2019-12-25
Attending: EMERGENCY MEDICINE
Payer: MEDICARE

## 2019-12-24 DIAGNOSIS — I82.4Y1 ACUTE DEEP VEIN THROMBOSIS (DVT) OF PROXIMAL VEIN OF RIGHT LOWER EXTREMITY (HCC): Primary | ICD-10-CM

## 2019-12-24 PROCEDURE — 85730 THROMBOPLASTIN TIME PARTIAL: CPT | Performed by: EMERGENCY MEDICINE

## 2019-12-24 PROCEDURE — 80053 COMPREHEN METABOLIC PANEL: CPT | Performed by: EMERGENCY MEDICINE

## 2019-12-24 PROCEDURE — 85610 PROTHROMBIN TIME: CPT | Performed by: EMERGENCY MEDICINE

## 2019-12-24 PROCEDURE — 36415 COLL VENOUS BLD VENIPUNCTURE: CPT

## 2019-12-24 PROCEDURE — 85025 COMPLETE CBC W/AUTO DIFF WBC: CPT | Performed by: EMERGENCY MEDICINE

## 2019-12-24 PROCEDURE — 99285 EMERGENCY DEPT VISIT HI MDM: CPT

## 2019-12-24 PROCEDURE — 93971 EXTREMITY STUDY: CPT | Performed by: EMERGENCY MEDICINE

## 2019-12-24 PROCEDURE — 96372 THER/PROPH/DIAG INJ SC/IM: CPT

## 2019-12-25 VITALS
OXYGEN SATURATION: 95 % | DIASTOLIC BLOOD PRESSURE: 88 MMHG | HEIGHT: 63 IN | WEIGHT: 140 LBS | TEMPERATURE: 98 F | BODY MASS INDEX: 24.8 KG/M2 | RESPIRATION RATE: 20 BRPM | HEART RATE: 71 BPM | SYSTOLIC BLOOD PRESSURE: 140 MMHG

## 2019-12-25 RX ORDER — POTASSIUM CHLORIDE 20 MEQ/1
40 TABLET, EXTENDED RELEASE ORAL ONCE
Status: COMPLETED | OUTPATIENT
Start: 2019-12-25 | End: 2019-12-25

## 2019-12-25 RX ORDER — WARFARIN SODIUM 2.5 MG/1
5 TABLET ORAL NIGHTLY
Qty: 60 TABLET | Refills: 0 | Status: SHIPPED | OUTPATIENT
Start: 2019-12-25 | End: 2019-12-25

## 2019-12-25 RX ORDER — WARFARIN SODIUM 5 MG/1
5 TABLET ORAL ONCE
Status: COMPLETED | OUTPATIENT
Start: 2019-12-25 | End: 2019-12-25

## 2019-12-25 RX ORDER — ENOXAPARIN SODIUM 100 MG/ML
1 INJECTION SUBCUTANEOUS 2 TIMES DAILY
Qty: 38.4 ML | Refills: 0 | Status: SHIPPED | OUTPATIENT
Start: 2019-12-25 | End: 2019-12-25

## 2019-12-25 RX ORDER — ENOXAPARIN SODIUM 100 MG/ML
60 INJECTION SUBCUTANEOUS ONCE
Status: COMPLETED | OUTPATIENT
Start: 2019-12-25 | End: 2019-12-25

## 2019-12-25 NOTE — ED PROVIDER NOTES
Patient Seen in: [facility] Emergency Department      History   Patient presents with:  Deep Vein Thrombosis    Stated Complaint: right leg swelling    HPI    Patient is 80years old active, no complaining of right leg swelling yesterday and today. significant lesions except small areas of erythema on the distal right lower leg  Neuro: Grossly intact to patient's baseline      ED Course     Labs Reviewed   COMP METABOLIC PANEL (14) - Abnormal; Notable for the following components:       Result Value was discussed with patient and her 2 daughters 1 of whom is a nurse and is comfortable managing the medications and will be in town with the patient for the next several days.   Since Dr. Alec Ott has retired family would like to follow-up with Nori Knight

## 2020-01-06 ENCOUNTER — OFFICE VISIT (OUTPATIENT)
Dept: HEMATOLOGY/ONCOLOGY | Facility: HOSPITAL | Age: 85
End: 2020-01-06
Attending: INTERNAL MEDICINE
Payer: MEDICARE

## 2020-01-06 VITALS
HEIGHT: 63 IN | TEMPERATURE: 98 F | DIASTOLIC BLOOD PRESSURE: 63 MMHG | SYSTOLIC BLOOD PRESSURE: 105 MMHG | RESPIRATION RATE: 16 BRPM | HEART RATE: 82 BPM | BODY MASS INDEX: 27.46 KG/M2 | WEIGHT: 155 LBS | OXYGEN SATURATION: 94 %

## 2020-01-06 DIAGNOSIS — R91.8 MASS OF UPPER LOBE OF LEFT LUNG: ICD-10-CM

## 2020-01-06 DIAGNOSIS — C44.81 BASAL CELL CARCINOMA (BCC) OF OVERLAPPING SITES OF SKIN: ICD-10-CM

## 2020-01-06 DIAGNOSIS — Z85.038 HISTORY OF MALIGNANT NEOPLASM OF COLON: Primary | ICD-10-CM

## 2020-01-06 DIAGNOSIS — R91.1 LUNG NODULE: ICD-10-CM

## 2020-01-06 DIAGNOSIS — C18.2 MALIGNANT NEOPLASM OF ASCENDING COLON (HCC): ICD-10-CM

## 2020-01-06 DIAGNOSIS — D09.9 SQUAMOUS CELL CARCINOMA IN SITU: ICD-10-CM

## 2020-01-06 LAB
ALBUMIN SERPL-MCNC: 3.4 G/DL (ref 3.4–5)
ALBUMIN/GLOB SERPL: 0.9 {RATIO} (ref 1–2)
ALP LIVER SERPL-CCNC: 103 U/L (ref 55–142)
ALT SERPL-CCNC: 6 U/L (ref 13–56)
ANION GAP SERPL CALC-SCNC: 7 MMOL/L (ref 0–18)
AST SERPL-CCNC: 21 U/L (ref 15–37)
BASOPHILS # BLD AUTO: 0.03 X10(3) UL (ref 0–0.2)
BASOPHILS NFR BLD AUTO: 0.5 %
BILIRUB SERPL-MCNC: 0.5 MG/DL (ref 0.1–2)
BUN BLD-MCNC: 13 MG/DL (ref 7–18)
BUN/CREAT SERPL: 16 (ref 10–20)
CALCIUM BLD-MCNC: 9.2 MG/DL (ref 8.5–10.1)
CEA SERPL-MCNC: 2.8 NG/ML (ref ?–5)
CHLORIDE SERPL-SCNC: 104 MMOL/L (ref 98–112)
CO2 SERPL-SCNC: 28 MMOL/L (ref 21–32)
CREAT BLD-MCNC: 0.81 MG/DL (ref 0.55–1.02)
DEPRECATED RDW RBC AUTO: 50.6 FL (ref 35.1–46.3)
EOSINOPHIL # BLD AUTO: 0.08 X10(3) UL (ref 0–0.7)
EOSINOPHIL NFR BLD AUTO: 1.2 %
ERYTHROCYTE [DISTWIDTH] IN BLOOD BY AUTOMATED COUNT: 14.6 % (ref 11–15)
GLOBULIN PLAS-MCNC: 3.8 G/DL (ref 2.8–4.4)
GLUCOSE BLD-MCNC: 95 MG/DL (ref 70–99)
HCT VFR BLD AUTO: 42.3 % (ref 35–48)
HGB BLD-MCNC: 13.5 G/DL (ref 12–16)
IMM GRANULOCYTES # BLD AUTO: 0.01 X10(3) UL (ref 0–1)
IMM GRANULOCYTES NFR BLD: 0.2 %
LDH SERPL L TO P-CCNC: 276 U/L (ref 84–246)
LYMPHOCYTES # BLD AUTO: 1.2 X10(3) UL (ref 1–4)
LYMPHOCYTES NFR BLD AUTO: 18.3 %
M PROTEIN MFR SERPL ELPH: 7.2 G/DL (ref 6.4–8.2)
MCH RBC QN AUTO: 30.3 PG (ref 26–34)
MCHC RBC AUTO-ENTMCNC: 31.9 G/DL (ref 31–37)
MCV RBC AUTO: 94.8 FL (ref 80–100)
MONOCYTES # BLD AUTO: 0.47 X10(3) UL (ref 0.1–1)
MONOCYTES NFR BLD AUTO: 7.2 %
NEUTROPHILS # BLD AUTO: 4.76 X10 (3) UL (ref 1.5–7.7)
NEUTROPHILS # BLD AUTO: 4.76 X10(3) UL (ref 1.5–7.7)
NEUTROPHILS NFR BLD AUTO: 72.6 %
OSMOLALITY SERPL CALC.SUM OF ELEC: 288 MOSM/KG (ref 275–295)
PLATELET # BLD AUTO: 336 10(3)UL (ref 150–450)
POTASSIUM SERPL-SCNC: 3.3 MMOL/L (ref 3.5–5.1)
RBC # BLD AUTO: 4.46 X10(6)UL (ref 3.8–5.3)
SODIUM SERPL-SCNC: 139 MMOL/L (ref 136–145)
WBC # BLD AUTO: 6.6 X10(3) UL (ref 4–11)

## 2020-01-06 PROCEDURE — 99205 OFFICE O/P NEW HI 60 MIN: CPT | Performed by: INTERNAL MEDICINE

## 2020-01-06 NOTE — PATIENT INSTRUCTIONS
For triage nurse: [phone] Monday through Friday 7:30-5:00.  *Please note this is a new phone number*    After hours or weekends for emergent needs:  [phone].      To schedule diagnostic testing: Central Scheduling: [place] [id number]

## 2020-01-06 NOTE — CONSULTS
Cancer Center Report of Consultation    Patient Name: Anneliese Vaughn   YOB: 1927   Medical Record Number: [id number]   CSN: [id number]   Consulting Physician: Leda Herrera MD  Referring Physician(s):   MD Dr. Dennise Pacheco contrast confirmed the presence of a DAO mass which extended to the hilum. There was also mass in the AP window felt to be extension of the tumor rather than adenopathy. A small layering left pleural effusion was also noted.  She was seen by pulmonary and a Performed by Robin Ritter MD at [facility] MAIN OR   • BENIGN BIOPSY RIGHT     • BIOPSY OF BREAST, INCISIONAL  2004    right   • BRONCHOSCOPY N/A 6/27/2019    Performed by Genoveva English MD at [facility] ENDOSCOPY   • CATARACT  2009    bilateral - no lens implants   • CHEM Lifestyle      Physical activity:        Days per week: Not on file        Minutes per session: Not on file      Stress: Not on file    Relationships      Social connections:        Talks on phone: Not on file        Gets together: Not on file        Atten oriented, not in acute distress. HEENT: EOMs intact. PERRL. Oropharynx is clear. Neck: No JVD. No palpable lymphadenopathy. Neck is supple. Scabbed crusty lesion tip of her nose  Chest: Diminished BS left  Heart: Regular rate and rhythm.    Abdomen: Soft 06/19/2019 1524    RDW 14.2 09/14/2018 1053    RED CELL DISTRIBUTION WIDTH 21.7 (H) 08/04/2014 0617    RED CELL DISTRIBUTION WIDTH 21.5 (H) 08/03/2014 0938    RED CELL DISTRIBUTION WIDTH 21.4 (H) 08/02/2014 0752    PRELIMINARY NEUTROPHIL ABS 4.26 08/04/201 GFR, Non- 85 06/20/2019 0554    GFR, Non- 79 06/19/2019 1523    CALCIUM 8.5 (L) 08/04/2014 0617    CALCIUM 8.5 (L) 08/03/2014 0938    CALCIUM 9.9 08/01/2014 1730    Calcium, Total 8.4 (L) 12/24/2019 2344    Calcium, Total 8. (L) 05/22/2014 1143    BILIRUBIN, TOTAL 0.3 08/01/2014 1730    BILIRUBIN, TOTAL 0.4 06/21/2014 1315    BILIRUBIN, TOTAL 0.2 05/22/2014 1143    Bilirubin, Total 0.5 12/24/2019 2344    Bilirubin, Total 0.9 06/20/2019 0554    Bilirubin, Total 0.9 06/19/2019 1 atelectasis from the right lower lobe has resolved. No developing pneumothorax. No sign of pulmonary edema. No acute pneumonia. No adrenal mass is identified.   Nonspecific low-attenuation lesions are seen in the liver, as before, not fully ch Registry. PATIENT STATED HISTORY: (As transcribed by Technologist)  The patient had abnormal chest x-ray.          FINDINGS:  Sensitivity is decreased without IV contrast.  LUNGS:  There is a mass in the medial left upper lobe, which extends to the hilu 6/20/2019 at 9:44       Approved by: Skinny Hutchins MD        PROCEDURE:  US VENOUS DOPPLER LEG RIGHT - DIAG IMG ([id number])     COMPARISON:  None.      INDICATIONS:  eval for DVT     TECHNIQUE:  Real time, grey scale, and duplex ultrasound was used to evaluat malignancy   suspected, and CT of the chest follow-up advised for further assessment. Mild elevation left diaphragm suggest volume loss. On the right there is no sign of pneumonia, effusion or other active disease. The heart is normal size.   No pneum the right hepatic lobe, which was present previously in 2014. This currently measures 9.7 x 7.6 cm AP   and transverse dimensions, previously 9.0 x 7.0 cm. Normal appearance of the spleen, adrenal glands, and pancreas.   Cholecystectomy, and chronic com of Radiology) NRDR ([address]) which includes the Dose   Index Registry. PATIENT STATED HISTORY: (As transcribed by Technologist)  The patient had abnormal chest x-ray.          FINDINGS:  Sensitivity is decreased without IV contr Continued clinical correlation recommended. Dictated by: Rowdy Laureano MD on 6/20/2019 at 9:44       Approved by: Rowdy Laureano MD           Impression & Plan:   1. Extensive RLE DVT- currently on Eliquis and tolerating this well.  Has had previous DVT

## 2020-02-20 ENCOUNTER — TELEPHONE (OUTPATIENT)
Dept: CARDIOLOGY | Age: 85
End: 2020-02-20

## 2020-05-04 ENCOUNTER — TELEPHONE (OUTPATIENT)
Dept: FAMILY MEDICINE CLINIC | Facility: CLINIC | Age: 85
End: 2020-05-04

## 2020-05-04 NOTE — TELEPHONE ENCOUNTER
Does not have active [employer]. Please call and schedule for Medicare supervisit for June/July with Dr. Tierra Real. Thank you.

## 2020-05-18 RX ORDER — APIXABAN 5 MG/1
TABLET, FILM COATED ORAL
Qty: 60 TABLET | Refills: 2 | Status: SHIPPED | OUTPATIENT
Start: 2020-05-18 | End: 2020-08-10

## 2020-07-09 ENCOUNTER — TELEPHONE (OUTPATIENT)
Dept: CASE MANAGEMENT | Age: 85
End: 2020-07-09

## 2020-07-09 NOTE — TELEPHONE ENCOUNTER
Patient is eligible for a 2020 Medicare Advantage Supervisit. Left message to call back [phone].

## 2020-07-20 ENCOUNTER — TELEPHONE (OUTPATIENT)
Dept: CASE MANAGEMENT | Age: 85
End: 2020-07-20

## 2020-07-20 NOTE — TELEPHONE ENCOUNTER
Patient is eligible for a 2020 Medicare Advantage Supervisit. Pt. Not ready to schedule at this time please call back in a couple mths.

## 2020-08-10 RX ORDER — APIXABAN 5 MG/1
TABLET, FILM COATED ORAL
Qty: 60 TABLET | Refills: 2 | Status: SHIPPED | OUTPATIENT
Start: 2020-08-10 | End: 2020-11-04

## 2020-09-18 ENCOUNTER — TELEPHONE (OUTPATIENT)
Dept: CASE MANAGEMENT | Age: 85
End: 2020-09-18

## 2020-09-18 NOTE — TELEPHONE ENCOUNTER
Pt informedis eligible for 2020 Medicare Advantage Supervisit, states needs arrange transportation with her daughter and will call back to schedule.

## 2020-10-09 LAB
BASO%: 0.3 %
BASO%: 0.3 %
BASO%: 0.4 %
BASO: 0 10^3/UL
EOS%: 1.2 %
EOS%: 1.5 %
EOS%: 1.5 %
EOS: 0.1 10^3/UL
HCT: 40.1 % (ref 38–54)
HCT: 40.4 % (ref 38–54)
HCT: 44.1 % (ref 38–54)
HGB: 13.5 G/DL (ref 12–18)
HGB: 13.9 G/DL (ref 12–18)
HGB: 14.9 G/DL (ref 12–18)
LYMPH%: 26.9 % (ref 12–44)
LYMPH%: 31.9 % (ref 12–44)
LYMPH%: 32.4 % (ref 12–44)
LYMPH: 1.8 10^3/UL (ref 0.8–2.8)
LYMPH: 2 10^3/UL (ref 0.8–2.8)
LYMPH: 2.2 10^3/UL (ref 0.8–2.8)
MCH: 31.2 PG (ref 26–33)
MCH: 31.2 PG (ref 26–33)
MCH: 31.4 PG (ref 26–33)
MCHC: 33.7 G/DL (ref 31–36)
MCHC: 33.8 G/DL (ref 31–36)
MCHC: 34.4 G/DL (ref 31–36)
MCV: 91.2 FML (ref 82–100)
MCV: 92.5 FML (ref 82–100)
MCV: 92.6 FML (ref 82–100)
MONO%: 7.9 % (ref 2–12)
MONO%: 8.9 % (ref 2–12)
MONO%: 9.1 % (ref 2–12)
MONO: 0.5 10^3/UL (ref 0.2–1)
MONO: 0.6 10^3/UL (ref 0.2–1)
MONO: 0.6 10^3/UL (ref 0.2–1)
MPV: 10 FML (ref 8.6–11.7)
MPV: 8.7 FML (ref 8.6–11.7)
MPV: 9.7 FML (ref 8.6–11.7)
NEUT%: 56.9 % (ref 47–76)
NEUT%: 57.4 % (ref 47–76)
NEUT%: 63.4 % (ref 47–76)
NEUT: 3.6 10^3/UL (ref 1.5–7.1)
NEUT: 3.9 10^3/UL (ref 1.5–7.1)
NEUT: 4.4 10^3/UL (ref 1.5–7.1)
PLT: 190 10^3/UL (ref 150–375)
PLT: 223 10^3/UL (ref 150–375)
PLT: 232 10^3/UL (ref 150–375)
RBC: 4.33 10^6/UL (ref 4.2–6.2)
RBC: 4.43 10^6/UL (ref 4.2–6.2)
RBC: 4.77 10^6/UL (ref 4.2–6.2)
RDW-CV: 13.5 %
RDW-CV: 14 %
RDW-CV: 14.4 %
RDW-SD: 44.3 FML (ref 36–50)
RDW-SD: 46.2 FML (ref 36–50)
RDW-SD: 47.6 FML (ref 36–50)
WBC: 6.2 10^3/UL (ref 4.3–11)
WBC: 6.8 10^3/UL (ref 4.3–11)
WBC: 6.9 10^3/UL (ref 4.3–11)

## 2020-10-11 VITALS
DIASTOLIC BLOOD PRESSURE: 72 MMHG | WEIGHT: 155.01 LBS | SYSTOLIC BLOOD PRESSURE: 132 MMHG | HEIGHT: 62 IN | BODY MASS INDEX: 28.52 KG/M2

## 2020-10-11 VITALS — WEIGHT: 156.99 LBS | SYSTOLIC BLOOD PRESSURE: 136 MMHG | DIASTOLIC BLOOD PRESSURE: 86 MMHG

## 2020-10-11 VITALS
HEIGHT: 62 IN | BODY MASS INDEX: 27.42 KG/M2 | SYSTOLIC BLOOD PRESSURE: 132 MMHG | WEIGHT: 149.01 LBS | DIASTOLIC BLOOD PRESSURE: 72 MMHG

## 2020-10-29 ENCOUNTER — OFFICE VISIT (OUTPATIENT)
Dept: FAMILY MEDICINE CLINIC | Facility: CLINIC | Age: 85
End: 2020-10-29
Payer: COMMERCIAL

## 2020-10-29 VITALS
HEART RATE: 76 BPM | DIASTOLIC BLOOD PRESSURE: 76 MMHG | SYSTOLIC BLOOD PRESSURE: 108 MMHG | BODY MASS INDEX: 24.8 KG/M2 | OXYGEN SATURATION: 96 % | HEIGHT: 63 IN | RESPIRATION RATE: 20 BRPM | WEIGHT: 140 LBS

## 2020-10-29 DIAGNOSIS — Z00.00 ENCOUNTER FOR ANNUAL HEALTH EXAMINATION: Primary | ICD-10-CM

## 2020-10-29 DIAGNOSIS — R91.8 MASS OF LUNG: ICD-10-CM

## 2020-10-29 DIAGNOSIS — Z23 FLU VACCINE NEED: ICD-10-CM

## 2020-10-29 DIAGNOSIS — Z79.01 CHRONIC ANTICOAGULATION: ICD-10-CM

## 2020-10-29 DIAGNOSIS — M47.819 ARTHRITIS OF SPINE: ICD-10-CM

## 2020-10-29 DIAGNOSIS — M81.8 OTHER OSTEOPOROSIS WITHOUT CURRENT PATHOLOGICAL FRACTURE: ICD-10-CM

## 2020-10-29 DIAGNOSIS — E27.8 ADRENAL NODULE (HCC): ICD-10-CM

## 2020-10-29 DIAGNOSIS — I70.0 ATHEROSCLEROSIS OF AORTA (HCC): ICD-10-CM

## 2020-10-29 DIAGNOSIS — I77.811 ECTATIC ABDOMINAL AORTA (HCC): ICD-10-CM

## 2020-10-29 PROBLEM — E87.6 HYPOKALEMIA: Status: RESOLVED | Noted: 2019-06-19 | Resolved: 2020-10-29

## 2020-10-29 PROBLEM — T79.6XXA TRAUMATIC RHABDOMYOLYSIS (HCC): Status: RESOLVED | Noted: 2019-06-19 | Resolved: 2020-10-29

## 2020-10-29 PROBLEM — J93.9 PNEUMOTHORAX, UNSPECIFIED TYPE: Status: RESOLVED | Noted: 2019-06-19 | Resolved: 2020-10-29

## 2020-10-29 PROBLEM — T79.6XXA TRAUMATIC RHABDOMYOLYSIS, INITIAL ENCOUNTER (HCC): Status: RESOLVED | Noted: 2019-06-19 | Resolved: 2020-10-29
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
## 2020-10-29 PROBLEM — L02.211 ABDOMINAL WALL ABSCESS: Status: RESOLVED | Noted: 2019-06-19 | Resolved: 2020-10-29

## 2020-10-29 PROCEDURE — 96160 PT-FOCUSED HLTH RISK ASSMT: CPT | Performed by: FAMILY MEDICINE

## 2020-10-29 PROCEDURE — 3008F BODY MASS INDEX DOCD: CPT | Performed by: FAMILY MEDICINE

## 2020-10-29 PROCEDURE — G0439 PPPS, SUBSEQ VISIT: HCPCS | Performed by: FAMILY MEDICINE

## 2020-10-29 PROCEDURE — 3078F DIAST BP <80 MM HG: CPT | Performed by: FAMILY MEDICINE

## 2020-10-29 PROCEDURE — 3074F SYST BP LT 130 MM HG: CPT | Performed by: FAMILY MEDICINE

## 2020-10-29 PROCEDURE — 99397 PER PM REEVAL EST PAT 65+ YR: CPT | Performed by: FAMILY MEDICINE

## 2020-10-29 PROCEDURE — 90662 IIV NO PRSV INCREASED AG IM: CPT | Performed by: FAMILY MEDICINE

## 2020-10-29 PROCEDURE — G0008 ADMIN INFLUENZA VIRUS VAC: HCPCS | Performed by: FAMILY MEDICINE

## 2020-10-29 NOTE — PROGRESS NOTES
HPI:   Paul Scanlon is a 80year old female who presents for a Medicare Subsequent Annual Wellness visit (Pt already had Initial Annual Wellness). Being followed by oncology for left lung mass suspected to be lung cancer.   Reasonably the family has Groceries based on screening of functional status. Shop for groceries: (P) Cannot do without help   She has difficulties Taking Meds as Rx'd based on screening of functional status.    Taking medications as prescribed: (P) Need some help   She has problem labs)   Lab Results   Component Value Date    WBC 6.6 01/06/2020    HGB 13.5 01/06/2020    .0 01/06/2020        ALLERGIES:   She is allergic to prednisone and radiology contrast iodinated dyes.     CURRENT MEDICATIONS:     •  ELIQUIS 5 MG Oral Tab, T Decreased high-frequency tuning fork bilaterally. Having some difficulty with conversation with the mask on. Her daughter states that she does fine when there is no mask in place.      Visual Acuity  Right Eye Visual Acuity: Uncorrected Right Eye Chart between bites to help stop food from feeling stuck in the throat.   Chronic anticoagulation  Tolerating Eliquis  Atherosclerosis of aorta (HCC)  Off of antihypertensives and cholesterol medicines at this time  Adrenal nodule ([address]  No follow-up required at if medically necessary Electrocardiogram date06/19/2019       Colorectal Cancer Screening      Colonoscopy Screen every 10 years There are no preventive care reminders to display for this patient.  Update Health Maintenance if applicable    Flex Sigmoidosco TDaP Not covered by Medicare Part B No vaccine history found This may be covered with your prescription benefits, but Medicare does not cover unless Medically needed    Zoster  Not covered by Medicare Part B No vaccine history found This may be covered wit

## 2020-10-29 NOTE — PATIENT INSTRUCTIONS
Lev Chavez's SCREENING SCHEDULE   Tests on this list are recommended by your physician but may not be covered, or covered at this frequency, by your insurer. Please check with your insurance carrier before scheduling to verify coverage.    PREVENTATIV criteria:   • Men who are 73-68 years old and have smoked more than 100 cigarettes in their lifetime   • Anyone with a family history    Colorectal Cancer Screening  Covered up to Age 76     Colonoscopy Screen   Covered every 10 years- more often if abnorm regularly   Immunizations      Influenza  Covered Annually Orders placed or performed in visit on 10/29/20   • FLU VACC HIGH DOSE PRSV FREE   Orders placed or performed in visit on 09/27/13   • INFLUENZA VIRUS VACCINE, PRESERV FREE, >=1YEARS OF AGE   • AD forms are also available in [place]  www. putitinwriting. org  This link also has information from the [facility] regarding Advance Directives.

## 2020-11-04 RX ORDER — APIXABAN 5 MG/1
TABLET, FILM COATED ORAL
Qty: 60 TABLET | Refills: 2 | Status: SHIPPED | OUTPATIENT
Start: 2020-11-04 | End: 2021-01-22

## 2021-01-22 ENCOUNTER — TELEPHONE (OUTPATIENT)
Dept: HEMATOLOGY/ONCOLOGY | Facility: HOSPITAL | Age: 86
End: 2021-01-22

## 2021-01-22 RX ORDER — APIXABAN 5 MG/1
TABLET, FILM COATED ORAL
Qty: 60 TABLET | Refills: 0 | Status: SHIPPED | OUTPATIENT
Start: 2021-01-22 | End: 2021-02-11

## 2021-02-11 ENCOUNTER — OFFICE VISIT (OUTPATIENT)
Dept: HEMATOLOGY/ONCOLOGY | Facility: HOSPITAL | Age: 86
End: 2021-02-11
Attending: INTERNAL MEDICINE
Payer: MEDICARE

## 2021-02-11 VITALS
HEART RATE: 88 BPM | OXYGEN SATURATION: 95 % | RESPIRATION RATE: 16 BRPM | TEMPERATURE: 99 F | SYSTOLIC BLOOD PRESSURE: 144 MMHG | DIASTOLIC BLOOD PRESSURE: 77 MMHG

## 2021-02-11 DIAGNOSIS — Z85.038 HISTORY OF COLON CANCER: ICD-10-CM

## 2021-02-11 DIAGNOSIS — D09.9 SQUAMOUS CELL CARCINOMA IN SITU: ICD-10-CM

## 2021-02-11 DIAGNOSIS — R91.8 MASS OF UPPER LOBE OF LEFT LUNG: ICD-10-CM

## 2021-02-11 DIAGNOSIS — C44.81 BASAL CELL CARCINOMA (BCC) OF OVERLAPPING SITES OF SKIN: ICD-10-CM

## 2021-02-11 DIAGNOSIS — Z79.01 CHRONIC ANTICOAGULATION: ICD-10-CM

## 2021-02-11 DIAGNOSIS — Z85.038 HISTORY OF MALIGNANT NEOPLASM OF COLON: Primary | ICD-10-CM

## 2021-02-11 LAB
ALBUMIN SERPL-MCNC: 3.2 G/DL (ref 3.4–5)
ALBUMIN/GLOB SERPL: 0.9 {RATIO} (ref 1–2)
ALP LIVER SERPL-CCNC: 81 U/L
ALT SERPL-CCNC: 10 U/L
ANION GAP SERPL CALC-SCNC: 6 MMOL/L (ref 0–18)
AST SERPL-CCNC: 15 U/L (ref 15–37)
BASOPHILS # BLD AUTO: 0.03 X10(3) UL (ref 0–0.2)
BASOPHILS NFR BLD AUTO: 0.5 %
BILIRUB SERPL-MCNC: 0.4 MG/DL (ref 0.1–2)
BUN BLD-MCNC: 13 MG/DL (ref 7–18)
BUN/CREAT SERPL: 23.6 (ref 10–20)
CALCIUM BLD-MCNC: 9 MG/DL (ref 8.5–10.1)
CEA SERPL-MCNC: 3.6 NG/ML (ref ?–5)
CHLORIDE SERPL-SCNC: 107 MMOL/L (ref 98–112)
CO2 SERPL-SCNC: 27 MMOL/L (ref 21–32)
CREAT BLD-MCNC: 0.55 MG/DL
DEPRECATED RDW RBC AUTO: 52.6 FL (ref 35.1–46.3)
EOSINOPHIL # BLD AUTO: 0.13 X10(3) UL (ref 0–0.7)
EOSINOPHIL NFR BLD AUTO: 2.3 %
ERYTHROCYTE [DISTWIDTH] IN BLOOD BY AUTOMATED COUNT: 14.6 % (ref 11–15)
GLOBULIN PLAS-MCNC: 3.5 G/DL (ref 2.8–4.4)
GLUCOSE BLD-MCNC: 135 MG/DL (ref 70–99)
HCT VFR BLD AUTO: 42 %
HGB BLD-MCNC: 13.9 G/DL
IMM GRANULOCYTES # BLD AUTO: 0.01 X10(3) UL (ref 0–1)
IMM GRANULOCYTES NFR BLD: 0.2 %
LDH SERPL L TO P-CCNC: 169 U/L
LYMPHOCYTES # BLD AUTO: 1.8 X10(3) UL (ref 1–4)
LYMPHOCYTES NFR BLD AUTO: 31.2 %
M PROTEIN MFR SERPL ELPH: 6.7 G/DL (ref 6.4–8.2)
MCH RBC QN AUTO: 32.3 PG (ref 26–34)
MCHC RBC AUTO-ENTMCNC: 33.1 G/DL (ref 31–37)
MCV RBC AUTO: 97.7 FL
MONOCYTES # BLD AUTO: 0.37 X10(3) UL (ref 0.1–1)
MONOCYTES NFR BLD AUTO: 6.4 %
NEUTROPHILS # BLD AUTO: 3.43 X10 (3) UL (ref 1.5–7.7)
NEUTROPHILS # BLD AUTO: 3.43 X10(3) UL (ref 1.5–7.7)
NEUTROPHILS NFR BLD AUTO: 59.4 %
OSMOLALITY SERPL CALC.SUM OF ELEC: 292 MOSM/KG (ref 275–295)
PATIENT FASTING Y/N/NP: NO
PLATELET # BLD AUTO: 222 10(3)UL (ref 150–450)
POTASSIUM SERPL-SCNC: 3.4 MMOL/L (ref 3.5–5.1)
RBC # BLD AUTO: 4.3 X10(6)UL
SODIUM SERPL-SCNC: 140 MMOL/L (ref 136–145)
WBC # BLD AUTO: 5.8 X10(3) UL (ref 4–11)

## 2021-02-11 PROCEDURE — 99214 OFFICE O/P EST MOD 30 MIN: CPT | Performed by: INTERNAL MEDICINE
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
## 2021-05-06 NOTE — TELEPHONE ENCOUNTER
Patient states needs medication ref[facility] Need to have this called as soon as possible, she is out of her medication. Attending

## 2021-06-16 ENCOUNTER — OFFICE VISIT (OUTPATIENT)
Dept: PODIATRY CLINIC | Facility: CLINIC | Age: 86
End: 2021-06-16
Payer: COMMERCIAL

## 2021-06-16 DIAGNOSIS — L60.0 ONYCHOCRYPTOSIS: ICD-10-CM

## 2021-06-16 DIAGNOSIS — B35.1 ONYCHOMYCOSIS: ICD-10-CM

## 2021-06-16 DIAGNOSIS — M79.675 PAIN IN TOES OF BOTH FEET: Primary | ICD-10-CM

## 2021-06-16 DIAGNOSIS — M79.674 PAIN IN TOES OF BOTH FEET: Primary | ICD-10-CM

## 2021-06-16 PROCEDURE — 11721 DEBRIDE NAIL 6 OR MORE: CPT | Performed by: PODIATRIST

## 2021-06-21 NOTE — PROGRESS NOTES
Sameer Neumann is a 80year old female. Patient presents with:  Toenail Care: 80year old female unable to trim her own nails. Daughter is here with her today, stating they did have some one coming to the house to trim her nails.  She noticed that the left Left 02/2018    left side forehead, Dr Vanessa Salvador   • [place]     • COLONOSCOPY  6/5/2014    Procedure: COLONOSCOPY;  Surgeon: Jaky Kirk MD;  Location: [facility] ENDOSCOPY   • TOM LOCALIZATION WIRE 1 SITE RIGHT (UDA=[id number])      2004 attached and a small portion of the eponychium there has been bleeding underneath it with some full somewhat painful to trim. 2. Vascular:  The patient has palpable pulses both dorsalis pedis and posterior tibial although the posterior tibial is weakened

## 2021-08-16 ENCOUNTER — OFFICE VISIT (OUTPATIENT)
Dept: PODIATRY CLINIC | Facility: CLINIC | Age: 86
End: 2021-08-16
Payer: COMMERCIAL

## 2021-08-16 DIAGNOSIS — L60.0 ONYCHOCRYPTOSIS: ICD-10-CM

## 2021-08-16 DIAGNOSIS — M79.675 PAIN IN TOES OF BOTH FEET: Primary | ICD-10-CM

## 2021-08-16 DIAGNOSIS — B35.1 ONYCHOMYCOSIS: ICD-10-CM

## 2021-08-16 DIAGNOSIS — M79.674 PAIN IN TOES OF BOTH FEET: Primary | ICD-10-CM

## 2021-08-16 PROCEDURE — 11721 DEBRIDE NAIL 6 OR MORE: CPT | Performed by: PODIATRIST

## 2021-08-16 NOTE — PROGRESS NOTES
Kieran Camp is a 80year old female. No chief complaint on file.         HPI:   This pleasant patient presents to the clinic her daughter is here with her today she states that she is coming to have her toenails trimmed they are thick and long thick she OTHER SURGICAL HISTORY  8/3/2014    Procedure: ABSCESS IRRIGATION & DEBRIDEMENT;  Surgeon: Alma Rosa Potter MD;  Location: [address] [place] [address] OR   • REMOVAL GALLBLADDER     • SKIN SURGERY  12/9/09    BCC ulcerated to R nose / Mohs surgery   • SKIN SURGERY  8-12-10    BCC-no Neurologic: The patient has intact pain sensation   4. Musculoskeletal: The patient has good muscle strength.     ASSESSMENT AND PLAN:   Diagnoses and all orders for this visit:    Pain in toes of both feet    Onychocryptosis    Onychomycosis        Plan: T

## 2022-01-01 ENCOUNTER — TELEPHONE (OUTPATIENT)
Dept: FAMILY MEDICINE CLINIC | Facility: CLINIC | Age: 87
End: 2022-01-01

## 2022-01-01 RX ORDER — CITALOPRAM HYDROBROMIDE 10 MG/1
TABLET ORAL
Qty: 30 TABLET | Refills: 0 | OUTPATIENT
Start: 2022-01-01

## 2022-02-21 RX ORDER — APIXABAN 5 MG/1
TABLET, FILM COATED ORAL
Qty: 60 TABLET | Refills: 0 | Status: SHIPPED | OUTPATIENT
Start: 2022-02-21 | End: 2022-03-18

## 2022-03-03 ENCOUNTER — TELEMEDICINE (OUTPATIENT)
Dept: FAMILY MEDICINE CLINIC | Facility: CLINIC | Age: 87
End: 2022-03-03
Payer: COMMERCIAL

## 2022-03-03 DIAGNOSIS — R91.8 MASS OF LUNG: Primary | ICD-10-CM

## 2022-03-03 PROCEDURE — 99212 OFFICE O/P EST SF 10 MIN: CPT | Performed by: FAMILY MEDICINE

## 2022-03-18 RX ORDER — APIXABAN 5 MG/1
TABLET, FILM COATED ORAL
Qty: 60 TABLET | Refills: 0 | Status: SHIPPED | OUTPATIENT
Start: 2022-03-18 | End: 2022-03-23

## 2022-03-23 ENCOUNTER — OFFICE VISIT (OUTPATIENT)
Dept: FAMILY MEDICINE CLINIC | Facility: CLINIC | Age: 87
End: 2022-03-23
Payer: COMMERCIAL

## 2022-03-23 VITALS
SYSTOLIC BLOOD PRESSURE: 100 MMHG | DIASTOLIC BLOOD PRESSURE: 62 MMHG | RESPIRATION RATE: 16 BRPM | OXYGEN SATURATION: 95 % | HEART RATE: 89 BPM

## 2022-03-23 DIAGNOSIS — R45.89 DEPRESSED MOOD: ICD-10-CM

## 2022-03-23 DIAGNOSIS — Z79.01 CHRONIC ANTICOAGULATION: Primary | ICD-10-CM

## 2022-03-23 DIAGNOSIS — Z86.718 HISTORY OF RECURRENT DEEP VEIN THROMBOSIS (DVT): ICD-10-CM

## 2022-03-23 PROCEDURE — 3074F SYST BP LT 130 MM HG: CPT | Performed by: FAMILY MEDICINE

## 2022-03-23 PROCEDURE — 3078F DIAST BP <80 MM HG: CPT | Performed by: FAMILY MEDICINE

## 2022-03-23 PROCEDURE — 99214 OFFICE O/P EST MOD 30 MIN: CPT | Performed by: FAMILY MEDICINE

## 2022-03-23 PROCEDURE — 3008F BODY MASS INDEX DOCD: CPT | Performed by: FAMILY MEDICINE

## 2022-03-23 RX ORDER — CITALOPRAM 10 MG/1
10 TABLET ORAL DAILY
Qty: 30 TABLET | Refills: 0 | Status: SHIPPED | OUTPATIENT
Start: 2022-03-23

## 2022-04-04 ENCOUNTER — TELEPHONE (OUTPATIENT)
Dept: FAMILY MEDICINE CLINIC | Facility: CLINIC | Age: 87
End: 2022-04-04

## 2022-04-04 RX ORDER — NITROFURANTOIN 25; 75 MG/1; MG/1
100 CAPSULE ORAL 2 TIMES DAILY
Qty: 10 CAPSULE | Refills: 0 | Status: SHIPPED | OUTPATIENT
Start: 2022-04-04 | End: 2022-04-09

## 2022-04-04 NOTE — TELEPHONE ENCOUNTER
Spoke with daughter she states mom is c/o dysuria,frequency sx x 3 days. .Daughter is requesting ABX. Daughter states unable to bring mom in. Please advise. Mom homebound and starting on hospice but they have not been at pt's home yet to initiate care.

## 2022-04-05 ENCOUNTER — APPOINTMENT (OUTPATIENT)
Dept: GENERAL RADIOLOGY | Facility: HOSPITAL | Age: 87
End: 2022-04-05
Attending: EMERGENCY MEDICINE
Payer: MEDICARE

## 2022-04-05 ENCOUNTER — APPOINTMENT (OUTPATIENT)
Dept: CT IMAGING | Facility: HOSPITAL | Age: 87
End: 2022-04-05
Attending: EMERGENCY MEDICINE
Payer: MEDICARE

## 2022-04-05 ENCOUNTER — HOSPITAL ENCOUNTER (INPATIENT)
Facility: HOSPITAL | Age: 87
LOS: 8 days | Discharge: HOSPICE | End: 2022-04-13
Attending: EMERGENCY MEDICINE | Admitting: HOSPITALIST
Payer: MEDICARE

## 2022-04-05 DIAGNOSIS — S72.009A HIP FRACTURE (HCC): ICD-10-CM

## 2022-04-05 DIAGNOSIS — G93.89 BRAIN MASS: ICD-10-CM

## 2022-04-05 DIAGNOSIS — Z87.81 S/P LEFT HIP FRACTURE: Primary | ICD-10-CM

## 2022-04-05 PROBLEM — R79.89 AZOTEMIA: Status: ACTIVE | Noted: 2022-04-05

## 2022-04-05 PROBLEM — R79.89 AZOTEMIA: Status: ACTIVE | Noted: 2022-01-01

## 2022-04-05 PROBLEM — R73.9 HYPERGLYCEMIA: Status: ACTIVE | Noted: 2022-04-05

## 2022-04-05 PROBLEM — R73.9 HYPERGLYCEMIA: Status: ACTIVE | Noted: 2022-01-01

## 2022-04-05 PROBLEM — E87.6 HYPOKALEMIA: Status: ACTIVE | Noted: 2022-01-01

## 2022-04-05 PROBLEM — E87.6 HYPOKALEMIA: Status: ACTIVE | Noted: 2022-04-05

## 2022-04-05 LAB
ALBUMIN SERPL-MCNC: 3.4 G/DL (ref 3.4–5)
ALBUMIN/GLOB SERPL: 0.9 {RATIO} (ref 1–2)
ALP LIVER SERPL-CCNC: 58 U/L
ALT SERPL-CCNC: 17 U/L
ANION GAP SERPL CALC-SCNC: 9 MMOL/L (ref 0–18)
ARTERIAL PATENCY WRIST A: POSITIVE
AST SERPL-CCNC: 32 U/L (ref 15–37)
ATRIAL RATE: 88 BPM
BASE EXCESS BLDA CALC-SCNC: 5 MMOL/L (ref ?–2)
BASOPHILS # BLD AUTO: 0.03 X10(3) UL (ref 0–0.2)
BASOPHILS NFR BLD AUTO: 0.3 %
BILIRUB SERPL-MCNC: 0.8 MG/DL (ref 0.1–2)
BODY TEMPERATURE: 98.6 F
BUN BLD-MCNC: 18 MG/DL (ref 7–18)
CA-I BLD-SCNC: 1.23 MMOL/L (ref 0.95–1.32)
CALCIUM BLD-MCNC: 9.3 MG/DL (ref 8.5–10.1)
CHLORIDE SERPL-SCNC: 99 MMOL/L (ref 98–112)
CO2 SERPL-SCNC: 30 MMOL/L (ref 21–32)
COHGB MFR BLD: 1.5 % SAT (ref 0–3)
CREAT BLD-MCNC: 0.42 MG/DL
EOSINOPHIL # BLD AUTO: 0.07 X10(3) UL (ref 0–0.7)
EOSINOPHIL NFR BLD AUTO: 0.7 %
ERYTHROCYTE [DISTWIDTH] IN BLOOD BY AUTOMATED COUNT: 15.1 %
GLOBULIN PLAS-MCNC: 3.8 G/DL (ref 2.8–4.4)
GLUCOSE BLD-MCNC: 110 MG/DL (ref 70–99)
HCO3 BLDA-SCNC: 28.8 MEQ/L (ref 21–27)
HCT VFR BLD AUTO: 42.8 %
HGB BLD-MCNC: 14.7 G/DL
HGB BLD-MCNC: 15.1 G/DL
IMM GRANULOCYTES # BLD AUTO: 0.03 X10(3) UL (ref 0–1)
IMM GRANULOCYTES NFR BLD: 0.3 %
L/M: 15 L/MIN
LACTATE BLD-SCNC: 1.4 MMOL/L (ref 0.5–2)
LYMPHOCYTES # BLD AUTO: 1.35 X10(3) UL (ref 1–4)
LYMPHOCYTES NFR BLD AUTO: 14.1 %
MCH RBC QN AUTO: 32.8 PG (ref 26–34)
MCHC RBC AUTO-ENTMCNC: 34.3 G/DL (ref 31–37)
MCV RBC AUTO: 95.5 FL
METHGB MFR BLD: 1 % SAT (ref 0.4–1.5)
MONOCYTES # BLD AUTO: 0.53 X10(3) UL (ref 0.1–1)
MONOCYTES NFR BLD AUTO: 5.5 %
NEUTROPHILS # BLD AUTO: 7.58 X10 (3) UL (ref 1.5–7.7)
NEUTROPHILS # BLD AUTO: 7.58 X10(3) UL (ref 1.5–7.7)
NEUTROPHILS NFR BLD AUTO: 79.1 %
OSMOLALITY SERPL CALC.SUM OF ELEC: 289 MOSM/KG (ref 275–295)
OXYHGB MFR BLDA: 95.1 % (ref 92–100)
P AXIS: 68 DEGREES
P-R INTERVAL: 144 MS
PCO2 BLDA: 63 MM HG (ref 35–45)
PH BLDA: 7.33 [PH] (ref 7.35–7.45)
PLATELET # BLD AUTO: 241 10(3)UL (ref 150–450)
PO2 BLDA: 88 MM HG (ref 80–100)
POTASSIUM BLD-SCNC: 3.1 MMOL/L (ref 3.6–5.1)
POTASSIUM SERPL-SCNC: 3.1 MMOL/L (ref 3.5–5.1)
PROT SERPL-MCNC: 7.2 G/DL (ref 6.4–8.2)
Q-T INTERVAL: 372 MS
QRS DURATION: 94 MS
QTC CALCULATION (BEZET): 450 MS
R AXIS: 21 DEGREES
RBC # BLD AUTO: 4.48 X10(6)UL
SARS-COV-2 RNA RESP QL NAA+PROBE: NOT DETECTED
SODIUM BLD-SCNC: 134 MMOL/L (ref 135–145)
SODIUM SERPL-SCNC: 138 MMOL/L (ref 136–145)
T AXIS: 74 DEGREES
VENTRICULAR RATE: 88 BPM
WBC # BLD AUTO: 9.6 X10(3) UL (ref 4–11)

## 2022-04-05 PROCEDURE — 93010 ELECTROCARDIOGRAM REPORT: CPT

## 2022-04-05 PROCEDURE — 96374 THER/PROPH/DIAG INJ IV PUSH: CPT

## 2022-04-05 PROCEDURE — 71045 X-RAY EXAM CHEST 1 VIEW: CPT | Performed by: NURSE PRACTITIONER

## 2022-04-05 PROCEDURE — 71045 X-RAY EXAM CHEST 1 VIEW: CPT | Performed by: EMERGENCY MEDICINE

## 2022-04-05 PROCEDURE — 5A0935A ASSISTANCE WITH RESPIRATORY VENTILATION, LESS THAN 24 CONSECUTIVE HOURS, HIGH NASAL FLOW/VELOCITY: ICD-10-PCS | Performed by: HOSPITALIST

## 2022-04-05 PROCEDURE — 70450 CT HEAD/BRAIN W/O DYE: CPT | Performed by: EMERGENCY MEDICINE

## 2022-04-05 PROCEDURE — 84132 ASSAY OF SERUM POTASSIUM: CPT | Performed by: INTERNAL MEDICINE

## 2022-04-05 PROCEDURE — 82375 ASSAY CARBOXYHB QUANT: CPT | Performed by: INTERNAL MEDICINE

## 2022-04-05 PROCEDURE — 83050 HGB METHEMOGLOBIN QUAN: CPT | Performed by: INTERNAL MEDICINE

## 2022-04-05 PROCEDURE — 99285 EMERGENCY DEPT VISIT HI MDM: CPT

## 2022-04-05 PROCEDURE — 36600 WITHDRAWAL OF ARTERIAL BLOOD: CPT | Performed by: INTERNAL MEDICINE

## 2022-04-05 PROCEDURE — 96361 HYDRATE IV INFUSION ADD-ON: CPT

## 2022-04-05 PROCEDURE — 80053 COMPREHEN METABOLIC PANEL: CPT | Performed by: EMERGENCY MEDICINE

## 2022-04-05 PROCEDURE — 93005 ELECTROCARDIOGRAM TRACING: CPT

## 2022-04-05 PROCEDURE — 83605 ASSAY OF LACTIC ACID: CPT | Performed by: INTERNAL MEDICINE

## 2022-04-05 PROCEDURE — 85018 HEMOGLOBIN: CPT | Performed by: INTERNAL MEDICINE

## 2022-04-05 PROCEDURE — 73552 X-RAY EXAM OF FEMUR 2/>: CPT | Performed by: EMERGENCY MEDICINE

## 2022-04-05 PROCEDURE — 84295 ASSAY OF SERUM SODIUM: CPT | Performed by: INTERNAL MEDICINE

## 2022-04-05 PROCEDURE — 82330 ASSAY OF CALCIUM: CPT | Performed by: INTERNAL MEDICINE

## 2022-04-05 PROCEDURE — 85025 COMPLETE CBC W/AUTO DIFF WBC: CPT | Performed by: EMERGENCY MEDICINE

## 2022-04-05 PROCEDURE — 73590 X-RAY EXAM OF LOWER LEG: CPT | Performed by: EMERGENCY MEDICINE

## 2022-04-05 PROCEDURE — 96375 TX/PRO/DX INJ NEW DRUG ADDON: CPT

## 2022-04-05 PROCEDURE — 82803 BLOOD GASES ANY COMBINATION: CPT | Performed by: INTERNAL MEDICINE

## 2022-04-05 PROCEDURE — 73502 X-RAY EXAM HIP UNI 2-3 VIEWS: CPT | Performed by: EMERGENCY MEDICINE

## 2022-04-05 RX ORDER — MORPHINE SULFATE 2 MG/ML
2 INJECTION, SOLUTION INTRAMUSCULAR; INTRAVENOUS EVERY 2 HOUR PRN
Status: DISCONTINUED | OUTPATIENT
Start: 2022-04-05 | End: 2022-04-06

## 2022-04-05 RX ORDER — DEXTROSE AND SODIUM CHLORIDE 5; .45 G/100ML; G/100ML
INJECTION, SOLUTION INTRAVENOUS CONTINUOUS
Status: DISCONTINUED | OUTPATIENT
Start: 2022-04-05 | End: 2022-04-05

## 2022-04-05 RX ORDER — METOCLOPRAMIDE HYDROCHLORIDE 5 MG/ML
5 INJECTION INTRAMUSCULAR; INTRAVENOUS EVERY 8 HOURS PRN
Status: DISCONTINUED | OUTPATIENT
Start: 2022-04-05 | End: 2022-04-06

## 2022-04-05 RX ORDER — HYDROCODONE BITARTRATE AND ACETAMINOPHEN 5; 325 MG/1; MG/1
2 TABLET ORAL EVERY 4 HOURS PRN
Status: DISCONTINUED | OUTPATIENT
Start: 2022-04-05 | End: 2022-04-13

## 2022-04-05 RX ORDER — MORPHINE SULFATE 2 MG/ML
1 INJECTION, SOLUTION INTRAMUSCULAR; INTRAVENOUS EVERY 2 HOUR PRN
Status: DISCONTINUED | OUTPATIENT
Start: 2022-04-05 | End: 2022-04-06

## 2022-04-05 RX ORDER — SODIUM CHLORIDE 9 MG/ML
125 INJECTION, SOLUTION INTRAVENOUS CONTINUOUS
Status: DISCONTINUED | OUTPATIENT
Start: 2022-04-05 | End: 2022-04-08

## 2022-04-05 RX ORDER — HYDROCODONE BITARTRATE AND ACETAMINOPHEN 5; 325 MG/1; MG/1
1 TABLET ORAL EVERY 4 HOURS PRN
Status: DISCONTINUED | OUTPATIENT
Start: 2022-04-05 | End: 2022-04-13

## 2022-04-05 RX ORDER — ONDANSETRON 2 MG/ML
4 INJECTION INTRAMUSCULAR; INTRAVENOUS EVERY 6 HOURS PRN
Status: DISCONTINUED | OUTPATIENT
Start: 2022-04-05 | End: 2022-04-13

## 2022-04-05 RX ORDER — MORPHINE SULFATE 4 MG/ML
4 INJECTION, SOLUTION INTRAMUSCULAR; INTRAVENOUS EVERY 30 MIN PRN
Status: DISCONTINUED | OUTPATIENT
Start: 2022-04-05 | End: 2022-04-05

## 2022-04-05 RX ORDER — ONDANSETRON 2 MG/ML
4 INJECTION INTRAMUSCULAR; INTRAVENOUS ONCE
Status: COMPLETED | OUTPATIENT
Start: 2022-04-05 | End: 2022-04-05

## 2022-04-05 RX ORDER — MORPHINE SULFATE 2 MG/ML
0.5 INJECTION, SOLUTION INTRAMUSCULAR; INTRAVENOUS EVERY 2 HOUR PRN
Status: DISCONTINUED | OUTPATIENT
Start: 2022-04-05 | End: 2022-04-06

## 2022-04-05 RX ORDER — MELATONIN
3 NIGHTLY PRN
Status: DISCONTINUED | OUTPATIENT
Start: 2022-04-05 | End: 2022-04-13

## 2022-04-05 RX ORDER — ALBUTEROL SULFATE 2.5 MG/3ML
SOLUTION RESPIRATORY (INHALATION)
Status: DISPENSED
Start: 2022-04-05 | End: 2022-04-06

## 2022-04-05 RX ORDER — ACETAMINOPHEN 325 MG/1
650 TABLET ORAL EVERY 4 HOURS PRN
Status: DISCONTINUED | OUTPATIENT
Start: 2022-04-05 | End: 2022-04-13

## 2022-04-05 RX ORDER — ACETAMINOPHEN 500 MG
500 TABLET ORAL EVERY 6 HOURS PRN
COMMUNITY

## 2022-04-05 RX ORDER — ESCITALOPRAM OXALATE 5 MG/1
5 TABLET ORAL DAILY
Refills: 0 | Status: DISCONTINUED | OUTPATIENT
Start: 2022-04-05 | End: 2022-04-13

## 2022-04-05 RX ORDER — MORPHINE SULFATE 2 MG/ML
2 INJECTION, SOLUTION INTRAMUSCULAR; INTRAVENOUS EVERY 30 MIN PRN
Status: DISCONTINUED | OUTPATIENT
Start: 2022-04-05 | End: 2022-04-05

## 2022-04-05 RX ORDER — ONDANSETRON 2 MG/ML
4 INJECTION INTRAMUSCULAR; INTRAVENOUS EVERY 4 HOURS PRN
Status: DISCONTINUED | OUTPATIENT
Start: 2022-04-05 | End: 2022-04-05

## 2022-04-05 NOTE — CONSULTS
Patient is a 42-year-old white female took a fall at home where she lives and injured her left hip. Patient brought to the emergency room at hospital where x-rays revealed a displaced femoral neck fracture of the left hip. Patient's family is with her. Patient's family notes that she normally will be up walking with a walker independently. Patient's daughters note that she is able to go up and down stairs but does need assist for balance. Patient's exam shows her to have pain with any motion of her left hip. She also holds her left hip internally rotated. Good cap refill in the lower extremities. Active dorsiflexion plantarflexion of the foot and toes is present. Dorsalis pedis pulses 1/3 bilaterally. X-rays show the displaced left femoral neck fracture. Minimal degenerative changes of the hip joint are noted. Impression is that of a unstable displaced fracture of the left femoral neck. Recommendations are to proceed with a bipolar hemiarthroplasty of the left hip if no absolute contraindications medically. Patient did have a CT scan of her brain which showed no bleed but did show a lesion that might represent a metastatic disease. No history of metastatic disease noted. I did discuss with the family the risks and benefits of surgery. Explained to them that since she was ambulating prior to the injury that it would be an appropriate consideration to go ahead with the surgery. I also however mentioned that if they felt strongly against surgery that that would be a reasonable consideration as well. Family would like to proceed with the surgery. It is my feeling that this should enable her to be mobilized more quickly and facilitate her care in the months ahead. They do understand the risks of surgery include bleeding, infection, prosthetic failure and blood clots. They would like to proceed.

## 2022-04-05 NOTE — ED QUICK NOTES
Per daughter, pt has had increasing weakness, decreased appetite and loose stools. Daughter states they do have hospice coming to the home on Monday to discuss. Offered to see if someone can speak with her today but daughter states she would need her other sisters here also.

## 2022-04-05 NOTE — ED QUICK NOTES
Pt made comfortable on cart. Left knee supported with towel roll placed by EMS upon arrival. Pt does have some tenderness with palp to left hip also. Per daughter, PMD placed pt on macrobid due to pt symptoms and concern pt may have a UTI, however, she has only taken 1 dose and no urine had been sent on pt.

## 2022-04-05 NOTE — PLAN OF CARE
Problem: PAIN - ADULT  Goal: Verbalizes/displays adequate comfort level or patient's stated pain goal  Description: INTERVENTIONS:  - Encourage pt to monitor pain and request assistance  - Assess pain using appropriate pain scale  - Administer analgesics based on type and severity of pain and evaluate response  - Implement non-pharmacological measures as appropriate and evaluate response  - Consider cultural and social influences on pain and pain management  - Manage/alleviate anxiety  - Utilize distraction and/or relaxation techniques  - Monitor for opioid side effects  - Notify MD/LIP if interventions unsuccessful or patient reports new pain  - Anticipate increased pain with activity and pre-medicate as appropriate  Outcome: Progressing     Problem: SAFETY ADULT - FALL  Goal: Free from fall injury  Description: INTERVENTIONS:  - Assess pt frequently for physical needs  - Identify cognitive and physical deficits and behaviors that affect risk of falls.   - [place] fall precautions as indicated by assessment.  - Educate pt/family on patient safety including physical limitations  - Instruct pt to call for assistance with activity based on assessment  - Modify environment to reduce risk of injury  - Provide assistive devices as appropriate  - Consider OT/PT consult to assist with strengthening/mobility  - Encourage toileting schedule  Outcome: Progressing

## 2022-04-05 NOTE — PROGRESS NOTES
[facility] Pharmacy Note:  Renal Dose Adjustment for Metoclopramide (REGLAN)    Dulce Maria Corbin has been prescribed Metoclopramide (REGLAN) 10 mg every 8 hours as needed for nausea/vomiting,. Estimated CrCl=33 ml/min(rounding creatinine to 0.85)    Calculated creatinine clearance is < 40 ml/min, therefore, the dose of Metoclopramide (REGLAN) has been changed to 5 mg every 8 hours as needed for nausea/vomiting per P&T approved protocol. Pharmacy will continue to follow, and if renal function improves, will resume the original order.        Thank you,  Yuli Craft PharmD  4/5/2022 6:32 PM

## 2022-04-05 NOTE — PROGRESS NOTES
Dr. Perez [facility] called back for Dr. Charlee Townsend. Recommended to hold eliquis and no heparin at this time. Dr. Charlee Townsend will see patient in the morning.

## 2022-04-05 NOTE — ED NOTES
Discussed this case with Dr. Jaclyn Griffith from orthopedics. From [address]. He will see the patient but at this present time because of the multiple other medical problems the patient is at significant high risk and the family is unsure if they have what course at this present time that he normally be very aggressive in terms of both the brain and the hip. But will make final decision after discussing with all the family members and physicians.

## 2022-04-05 NOTE — ED NOTES
I discussed this case with Dr. Benigno Sellers as I have not heard from Dr. Derek Cruz at this present time. They will attempt to call him later.

## 2022-04-05 NOTE — PROGRESS NOTES
NURSING ADMISSION NOTE      Patient admitted via Cart from ER with diagnosis of left femoral head oblique fracture. Oriented to room. Patient awake but confused, daughter reported that patient has dementia. Safety precautions initiated. Transferred to bed, made comfortable. Bed in low position. Daughters at bedside. Call light in reach. Reinforced use of call light, bed alarm turned on. Dr. Javier Simon and Dr. Karlo Simeon paged. Dr. Karlo Simeon called back, will talk to the family later. Awaits for Dr. Javier Simon call back. Will also paged Dr. Ranjit Sue.

## 2022-04-05 NOTE — ED INITIAL ASSESSMENT (HPI)
Pt presents to the ED via EMS with complaints of fall at home. Per ems, they were called by family because pt fell with use of walker. Ems state pt had a witnessed fall onto hard floor; family assisted pt up to recliner. Pt had to be carried down stairs by ems and pt did not have complaints until she arrived here. Pt lying on cart with eyes open, oriented to person and place, skin w/d,resps reg/unlabored. Pt noted to have bruising to left knee. CMS intact.

## 2022-04-06 ENCOUNTER — APPOINTMENT (OUTPATIENT)
Dept: GENERAL RADIOLOGY | Facility: HOSPITAL | Age: 87
End: 2022-04-06
Attending: INTERNAL MEDICINE
Payer: MEDICARE

## 2022-04-06 ENCOUNTER — APPOINTMENT (OUTPATIENT)
Dept: GENERAL RADIOLOGY | Facility: HOSPITAL | Age: 87
End: 2022-04-06
Attending: EMERGENCY MEDICINE
Payer: MEDICARE

## 2022-04-06 LAB
ANION GAP SERPL CALC-SCNC: 9 MMOL/L (ref 0–18)
BASE EXCESS BLDA CALC-SCNC: 3.6 MMOL/L (ref ?–2)
BASOPHILS # BLD AUTO: 0.02 X10(3) UL (ref 0–0.2)
BASOPHILS NFR BLD AUTO: 0.2 %
BODY TEMPERATURE: 98.6 F
BUN BLD-MCNC: 17 MG/DL (ref 7–18)
CALCIUM BLD-MCNC: 8.9 MG/DL (ref 8.5–10.1)
CHLORIDE SERPL-SCNC: 102 MMOL/L (ref 98–112)
CO2 SERPL-SCNC: 27 MMOL/L (ref 21–32)
COHGB MFR BLD: 1.6 % SAT (ref 0–3)
CREAT BLD-MCNC: 0.42 MG/DL
EOSINOPHIL # BLD AUTO: 0 X10(3) UL (ref 0–0.7)
EOSINOPHIL NFR BLD AUTO: 0 %
ERYTHROCYTE [DISTWIDTH] IN BLOOD BY AUTOMATED COUNT: 15 %
GLUCOSE BLD-MCNC: 125 MG/DL (ref 70–99)
HCO3 BLDA-SCNC: 27.6 MEQ/L (ref 21–27)
HCT VFR BLD AUTO: 42.9 %
HGB BLD-MCNC: 14.1 G/DL
HGB BLD-MCNC: 14.1 G/DL
IMM GRANULOCYTES # BLD AUTO: 0.13 X10(3) UL (ref 0–1)
IMM GRANULOCYTES NFR BLD: 1 %
L/M: 4 L/MIN
LYMPHOCYTES # BLD AUTO: 0.4 X10(3) UL (ref 1–4)
LYMPHOCYTES NFR BLD AUTO: 3.1 %
MCH RBC QN AUTO: 32.5 PG (ref 26–34)
MCHC RBC AUTO-ENTMCNC: 32.9 G/DL (ref 31–37)
MCV RBC AUTO: 98.8 FL
METHGB MFR BLD: 1.4 % SAT (ref 0.4–1.5)
MONOCYTES # BLD AUTO: 0.44 X10(3) UL (ref 0.1–1)
MONOCYTES NFR BLD AUTO: 3.4 %
NEUTROPHILS # BLD AUTO: 11.78 X10 (3) UL (ref 1.5–7.7)
NEUTROPHILS # BLD AUTO: 11.78 X10(3) UL (ref 1.5–7.7)
NEUTROPHILS NFR BLD AUTO: 92.3 %
OSMOLALITY SERPL CALC.SUM OF ELEC: 289 MOSM/KG (ref 275–295)
OXYHGB MFR BLDA: 94 % (ref 92–100)
PCO2 BLDA: 43 MM HG (ref 35–45)
PH BLDA: 7.43 [PH] (ref 7.35–7.45)
PLATELET # BLD AUTO: 219 10(3)UL (ref 150–450)
PO2 BLDA: 76 MM HG (ref 80–100)
POTASSIUM SERPL-SCNC: 3 MMOL/L (ref 3.5–5.1)
RBC # BLD AUTO: 4.34 X10(6)UL
SODIUM SERPL-SCNC: 138 MMOL/L (ref 136–145)
WBC # BLD AUTO: 12.8 X10(3) UL (ref 4–11)

## 2022-04-06 PROCEDURE — 83050 HGB METHEMOGLOBIN QUAN: CPT | Performed by: NURSE PRACTITIONER

## 2022-04-06 PROCEDURE — 82375 ASSAY CARBOXYHB QUANT: CPT | Performed by: NURSE PRACTITIONER

## 2022-04-06 PROCEDURE — 51701 INSERT BLADDER CATHETER: CPT

## 2022-04-06 PROCEDURE — 80048 BASIC METABOLIC PNL TOTAL CA: CPT | Performed by: HOSPITALIST

## 2022-04-06 PROCEDURE — 92610 EVALUATE SWALLOWING FUNCTION: CPT

## 2022-04-06 PROCEDURE — 76937 US GUIDE VASCULAR ACCESS: CPT

## 2022-04-06 PROCEDURE — P9045 ALBUMIN (HUMAN), 5%, 250 ML: HCPCS | Performed by: NURSE PRACTITIONER

## 2022-04-06 PROCEDURE — 36410 VNPNXR 3YR/> PHY/QHP DX/THER: CPT

## 2022-04-06 PROCEDURE — 71045 X-RAY EXAM CHEST 1 VIEW: CPT | Performed by: INTERNAL MEDICINE

## 2022-04-06 PROCEDURE — 82803 BLOOD GASES ANY COMBINATION: CPT | Performed by: NURSE PRACTITIONER

## 2022-04-06 PROCEDURE — 85018 HEMOGLOBIN: CPT | Performed by: NURSE PRACTITIONER

## 2022-04-06 PROCEDURE — 92526 ORAL FUNCTION THERAPY: CPT

## 2022-04-06 PROCEDURE — 71045 X-RAY EXAM CHEST 1 VIEW: CPT | Performed by: NURSE PRACTITIONER

## 2022-04-06 PROCEDURE — 85025 COMPLETE CBC W/AUTO DIFF WBC: CPT | Performed by: NURSE PRACTITIONER

## 2022-04-06 PROCEDURE — 36600 WITHDRAWAL OF ARTERIAL BLOOD: CPT | Performed by: NURSE PRACTITIONER

## 2022-04-06 RX ORDER — SODIUM CHLORIDE 9 MG/ML
500 INJECTION, SOLUTION INTRAVENOUS CONTINUOUS
Status: ACTIVE | OUTPATIENT
Start: 2022-04-06 | End: 2022-04-06

## 2022-04-06 RX ORDER — METOCLOPRAMIDE HYDROCHLORIDE 5 MG/ML
10 INJECTION INTRAMUSCULAR; INTRAVENOUS EVERY 8 HOURS PRN
Status: DISCONTINUED | OUTPATIENT
Start: 2022-04-06 | End: 2022-04-13

## 2022-04-06 RX ORDER — MORPHINE SULFATE 2 MG/ML
2 INJECTION, SOLUTION INTRAMUSCULAR; INTRAVENOUS
Status: DISCONTINUED | OUTPATIENT
Start: 2022-04-06 | End: 2022-04-13

## 2022-04-06 RX ORDER — ACETAMINOPHEN 10 MG/ML
650 INJECTION, SOLUTION INTRAVENOUS EVERY 6 HOURS PRN
Status: DISCONTINUED | OUTPATIENT
Start: 2022-04-06 | End: 2022-04-13

## 2022-04-06 RX ORDER — MORPHINE SULFATE 2 MG/ML
1 INJECTION, SOLUTION INTRAMUSCULAR; INTRAVENOUS
Status: DISCONTINUED | OUTPATIENT
Start: 2022-04-06 | End: 2022-04-13

## 2022-04-06 RX ORDER — MORPHINE SULFATE 2 MG/ML
0.5 INJECTION, SOLUTION INTRAMUSCULAR; INTRAVENOUS
Status: DISCONTINUED | OUTPATIENT
Start: 2022-04-06 | End: 2022-04-13

## 2022-04-06 RX ORDER — POTASSIUM CHLORIDE 14.9 MG/ML
20 INJECTION INTRAVENOUS ONCE
Status: COMPLETED | OUTPATIENT
Start: 2022-04-06 | End: 2022-04-06

## 2022-04-06 RX ORDER — ALBUMIN, HUMAN INJ 5% 5 %
250 SOLUTION INTRAVENOUS ONCE
Status: COMPLETED | OUTPATIENT
Start: 2022-04-06 | End: 2022-04-06

## 2022-04-06 NOTE — PLAN OF CARE
Rapid from floor ~2000 for possible choking/aspiration on sip of water  Placed on 6 L HFNC on arrival. SPO2>95%. Weaned to 4 L HFNC overnight, maintaining O2 saturation. Baseline dementia. Oriented to self and place. ST/NSR. BP borderline hypotensive. 1 L LR bolus given per APN. Zosyn started for possible aspiration  NPO. Will need SLP eval after hip surgery  Unable to urinate overnight via purewick. Straight cath PRN.    Morphine PRN for hip fracture pain  Dr. Trey Mendosa updated on pt transfer to ICU  Family at bedside, updated on POC

## 2022-04-06 NOTE — PLAN OF CARE
Problem: PAIN - ADULT  Goal: Verbalizes/displays adequate comfort level or patient's stated pain goal  Description: INTERVENTIONS:  - Encourage pt to monitor pain and request assistance  - Assess pain using appropriate pain scale  - Administer analgesics based on type and severity of pain and evaluate response  - Implement non-pharmacological measures as appropriate and evaluate response  - Consider cultural and social influences on pain and pain management  - Manage/alleviate anxiety  - Utilize distraction and/or relaxation techniques  - Monitor for opioid side effects  - Notify MD/LIP if interventions unsuccessful or patient reports new pain  - Anticipate increased pain with activity and pre-medicate as appropriate  Outcome: Progressing     Problem: RESPIRATORY - ADULT  Goal: Achieves optimal ventilation and oxygenation  Description: INTERVENTIONS:  - Assess for changes in respiratory status  - Assess for changes in mentation and behavior  - Position to facilitate oxygenation and minimize respiratory effort  - Oxygen supplementation based on oxygen saturation or ABGs  - Provide Smoking Cessation handout, if applicable  - Encourage broncho-pulmonary hygiene including cough, deep breathe, Incentive Spirometry  - Assess the need for suctioning and perform as needed  - Assess and instruct to report SOB or any respiratory difficulty  - Respiratory Therapy support as indicated  - Manage/alleviate anxiety  - Monitor for signs/symptoms of CO2 retention  Outcome: Progressing    Received patient this a.m. alert to self and place but is forgetful. Complaining of left hip pain. Pain medications given as needed. Updated critical APN regarding blood pressure/ swallow study. IV bolus and albumin given. Updated critical MD of critical results. MD at bedside with family and answered all questions. Updated ortho MD who spoke to family via phone. OR for left hip canceled for the moment.  Speech at bedside with recommendations to family. Turning every 2 hours for comfort. Family discussing plan of care and options. bladder scan/straight cath per protocol. Will continue to monitor patient closely and keep her comfortable.

## 2022-04-06 NOTE — PROGRESS NOTES
Patient with choking episode after taking a sip of water  Oxygen sat done to 76% with oxygen at 2L/nc, patient with very soft cough, oral suction done with small clear secretions removed. RT placed patient on 5% venti mask with oxygen at 10, oxygen saturation persistently going down, patient remains alert and coherent.   RRT called in and team responded,  Nebulizer treatment given and patient transferred to ICU for overnight monitoring

## 2022-04-06 NOTE — PROGRESS NOTES
04/06/22 0819   Clinical Encounter Type   Visited With Patient and family together   Continue Visiting No   Crisis Visit Critical care   Anabaptist Encounters   Anabaptist Needs Prayer   Family Spiritual Encounters   Family Coping Accepting; Sadness   Taxonomy   Intended Effects Convey a calming presence   Interventions Acknowledge current situation; Active listening; Ask guided questions about cultural and Anabaptism values;Prayer for healing;Provde spiritual/Anabaptism resources   A HCPOA was brought in by daughters (to be scanned into EMR upon discharge).

## 2022-04-06 NOTE — PROGRESS NOTES
Patient is a 15-year-old white female admitted with a fracture of her left hip. Patient also has a history of dementia and also a lung mass and colon cancer. Patient also has a history of DVT and factor V Leiden. Patient was scheduled for surgery today however that was canceled due to concerns about the patient aspirating and also for a pneumothorax. Patient's exam shows her to have pain with any movement of the left hip. She has intact sensation lower extremities to light touch. Patient has active flexion extension of the toes and ankle. Impression is that of the unstable fracture of the proximal left femur. Recommendations are to wait another day or 2 for the patient to be stabilized and for the family to feel comfortable with proceeding with a hemiarthroplasty of the left hip. It is my feeling that we should be able to proceed with surgery on Friday. Patient's family is in agreement with this if the patient remains stable. Patient also is in agreement with proceeding.

## 2022-04-07 ENCOUNTER — APPOINTMENT (OUTPATIENT)
Dept: GENERAL RADIOLOGY | Facility: HOSPITAL | Age: 87
End: 2022-04-07
Attending: INTERNAL MEDICINE
Payer: MEDICARE

## 2022-04-07 ENCOUNTER — APPOINTMENT (OUTPATIENT)
Dept: CT IMAGING | Facility: HOSPITAL | Age: 87
End: 2022-04-07
Attending: INTERNAL MEDICINE
Payer: MEDICARE

## 2022-04-07 ENCOUNTER — ANESTHESIA EVENT (OUTPATIENT)
Dept: SURGERY | Facility: HOSPITAL | Age: 87
End: 2022-04-07
Payer: MEDICARE

## 2022-04-07 ENCOUNTER — ANESTHESIA (OUTPATIENT)
Dept: SURGERY | Facility: HOSPITAL | Age: 87
End: 2022-04-07
Payer: MEDICARE

## 2022-04-07 LAB
ALBUMIN SERPL-MCNC: 3 G/DL (ref 3.4–5)
ALBUMIN/GLOB SERPL: 0.9 {RATIO} (ref 1–2)
ALP LIVER SERPL-CCNC: 49 U/L
ALT SERPL-CCNC: 15 U/L
ANION GAP SERPL CALC-SCNC: 7 MMOL/L (ref 0–18)
AST SERPL-CCNC: 17 U/L (ref 15–37)
BASOPHILS # BLD AUTO: 0.02 X10(3) UL (ref 0–0.2)
BASOPHILS NFR BLD AUTO: 0.2 %
BILIRUB SERPL-MCNC: 0.8 MG/DL (ref 0.1–2)
BUN BLD-MCNC: 17 MG/DL (ref 7–18)
CALCIUM BLD-MCNC: 8.4 MG/DL (ref 8.5–10.1)
CHLORIDE SERPL-SCNC: 109 MMOL/L (ref 98–112)
CO2 SERPL-SCNC: 24 MMOL/L (ref 21–32)
CREAT BLD-MCNC: 0.31 MG/DL
EOSINOPHIL # BLD AUTO: 0.03 X10(3) UL (ref 0–0.7)
EOSINOPHIL NFR BLD AUTO: 0.3 %
ERYTHROCYTE [DISTWIDTH] IN BLOOD BY AUTOMATED COUNT: 15.6 %
GLOBULIN PLAS-MCNC: 3.2 G/DL (ref 2.8–4.4)
GLUCOSE BLD-MCNC: 100 MG/DL (ref 70–99)
HCT VFR BLD AUTO: 40.5 %
HGB BLD-MCNC: 13.1 G/DL
IMM GRANULOCYTES # BLD AUTO: 0.04 X10(3) UL (ref 0–1)
IMM GRANULOCYTES NFR BLD: 0.4 %
LYMPHOCYTES # BLD AUTO: 0.82 X10(3) UL (ref 1–4)
LYMPHOCYTES NFR BLD AUTO: 8.1 %
MAGNESIUM SERPL-MCNC: 1.8 MG/DL (ref 1.6–2.6)
MCH RBC QN AUTO: 32.2 PG (ref 26–34)
MCHC RBC AUTO-ENTMCNC: 32.3 G/DL (ref 31–37)
MCV RBC AUTO: 99.5 FL
MONOCYTES # BLD AUTO: 0.32 X10(3) UL (ref 0.1–1)
MONOCYTES NFR BLD AUTO: 3.2 %
NEUTROPHILS # BLD AUTO: 8.88 X10 (3) UL (ref 1.5–7.7)
NEUTROPHILS # BLD AUTO: 8.88 X10(3) UL (ref 1.5–7.7)
NEUTROPHILS NFR BLD AUTO: 87.8 %
OSMOLALITY SERPL CALC.SUM OF ELEC: 292 MOSM/KG (ref 275–295)
PHOSPHATE SERPL-MCNC: 1.8 MG/DL (ref 2.5–4.9)
PLATELET # BLD AUTO: 200 10(3)UL (ref 150–450)
POTASSIUM SERPL-SCNC: 3.5 MMOL/L (ref 3.5–5.1)
PROT SERPL-MCNC: 6.2 G/DL (ref 6.4–8.2)
RBC # BLD AUTO: 4.07 X10(6)UL
SODIUM SERPL-SCNC: 140 MMOL/L (ref 136–145)
WBC # BLD AUTO: 10.1 X10(3) UL (ref 4–11)

## 2022-04-07 PROCEDURE — 51701 INSERT BLADDER CATHETER: CPT

## 2022-04-07 PROCEDURE — 80053 COMPREHEN METABOLIC PANEL: CPT | Performed by: NURSE PRACTITIONER

## 2022-04-07 PROCEDURE — 32557 INSERT CATH PLEURA W/ IMAGE: CPT | Performed by: INTERNAL MEDICINE

## 2022-04-07 PROCEDURE — 85025 COMPLETE CBC W/AUTO DIFF WBC: CPT | Performed by: NURSE PRACTITIONER

## 2022-04-07 PROCEDURE — 71045 X-RAY EXAM CHEST 1 VIEW: CPT | Performed by: INTERNAL MEDICINE

## 2022-04-07 PROCEDURE — 84100 ASSAY OF PHOSPHORUS: CPT | Performed by: NURSE PRACTITIONER

## 2022-04-07 PROCEDURE — 83735 ASSAY OF MAGNESIUM: CPT | Performed by: NURSE PRACTITIONER

## 2022-04-07 PROCEDURE — 0W9B30Z DRAINAGE OF LEFT PLEURAL CAVITY WITH DRAINAGE DEVICE, PERCUTANEOUS APPROACH: ICD-10-PCS | Performed by: RADIOLOGY

## 2022-04-07 RX ORDER — MAGNESIUM SULFATE HEPTAHYDRATE 40 MG/ML
2 INJECTION, SOLUTION INTRAVENOUS ONCE
Status: COMPLETED | OUTPATIENT
Start: 2022-04-07 | End: 2022-04-07

## 2022-04-07 RX ORDER — MIDAZOLAM HYDROCHLORIDE 1 MG/ML
INJECTION INTRAMUSCULAR; INTRAVENOUS
Status: DISCONTINUED
Start: 2022-04-07 | End: 2022-04-07 | Stop reason: WASHOUT

## 2022-04-07 RX ORDER — NALOXONE HYDROCHLORIDE 0.4 MG/ML
80 INJECTION, SOLUTION INTRAMUSCULAR; INTRAVENOUS; SUBCUTANEOUS AS NEEDED
Status: DISCONTINUED | OUTPATIENT
Start: 2022-04-07 | End: 2022-04-07 | Stop reason: HOSPADM

## 2022-04-07 RX ORDER — SODIUM CHLORIDE 9 MG/ML
INJECTION, SOLUTION INTRAVENOUS CONTINUOUS
Status: DISCONTINUED | OUTPATIENT
Start: 2022-04-07 | End: 2022-04-07 | Stop reason: HOSPADM

## 2022-04-07 RX ORDER — MIDAZOLAM HYDROCHLORIDE 1 MG/ML
1 INJECTION INTRAMUSCULAR; INTRAVENOUS EVERY 5 MIN PRN
Status: DISCONTINUED | OUTPATIENT
Start: 2022-04-07 | End: 2022-04-07 | Stop reason: HOSPADM

## 2022-04-07 RX ORDER — FLUMAZENIL 0.1 MG/ML
0.2 INJECTION, SOLUTION INTRAVENOUS AS NEEDED
Status: DISCONTINUED | OUTPATIENT
Start: 2022-04-07 | End: 2022-04-07 | Stop reason: HOSPADM

## 2022-04-07 NOTE — IMAGING NOTE
Spoke with Mary Ohara RN at bedside and made aware that chest tube placement could be done this evening at around 7 pm (following another case) or tomorrow morning at 1100. I also asked that order be placed for this procedure. Pt unable to sign consent but will be able to obtain consent from family.

## 2022-04-07 NOTE — PROGRESS NOTES
Patient is a 28-year-old white female who was admitted to the hospital for a fracture of her left femoral neck. Patient has had delaying surgery for several reasons but predominantly for concerns related to aspiration and also to a pneumothorax. Presently the patient was scheduled for surgery today however that was delayed due to the desire of pulmonology to place a chest tube because of the pneumothorax and the need for anesthesia and likely intubation. Due to other risks the spinal anesthesia is not the best option. Patient's exam shows her to have pain with any movement of the left leg. Otherwise she is resting comfortably in bed. Impression is that of femoral neck fracture left hip. Recommendation is to go ahead with a hemiarthroplasty bipolar type left hip tomorrow afternoon. Chest tube will be placed this evening. Patient will likely be in the hospital for a couple days after the surgery and then transferred to a subacute rehab facility until she is able to ambulate independently. I did discuss this plan with three of the patient's daughters who agree to proceed with surgery tomorrow.

## 2022-04-07 NOTE — PLAN OF CARE
Pt received after change of shift report. Pt alert and oriented x2. Pt at baseline. Following commands. Pt appeared to be in pain using CPOT non-intubated scale. PRN tylenol given. Pt received on 2L nasal cannula. Oxygen saturation stable. Afebrile. Normal sinus rhythm on telemetry strip. Blood pressure stable. Pt NPO. Swabs prn. Bladder scan PRN. Straight cath x1. Family at bedside and updated on pt status and plan of care.

## 2022-04-07 NOTE — SLP NOTE
Attempted to see patient for reassessment of swallow function however pt currently NPO for scheduled hip repair. Discussed plan with pt's two daughters at bedside. Will continue to follow and complete when available and appropriate.     [place], [id number] [facility]  Pager

## 2022-04-07 NOTE — PROGRESS NOTES
Pharmacy Note: Renal dose adjustment   Wandy Ramírez was originally prescribed Reglan 10 mg every 8 hours as needed which was renally dose adjusted at the time of the original order per P&T approved renal dosing protocol. Renal function has now improved. Estimated Creatinine Clearance: 55.5 mL/min (A) (based on SCr of 0.42 mg/dL (L)). Her calculated creatinine clearance is now > 40 mL/min, therefore, the dose has been changed back to the original order per P&T approved protocol.       Thank you,   Maynor Garzon, PharmD  4/6/2022 8:00 PM

## 2022-04-07 NOTE — PLAN OF CARE
Axox2. Baseline per family. NSR. BP WDL  3 L HFNC to maintain SPO2>90%  Remains NPO  Unable to urinate.  Straight cath x1  Daughters updated on POC

## 2022-04-08 ENCOUNTER — APPOINTMENT (OUTPATIENT)
Dept: GENERAL RADIOLOGY | Facility: HOSPITAL | Age: 87
End: 2022-04-08
Attending: EMERGENCY MEDICINE
Payer: MEDICARE

## 2022-04-08 ENCOUNTER — APPOINTMENT (OUTPATIENT)
Dept: GENERAL RADIOLOGY | Facility: HOSPITAL | Age: 87
End: 2022-04-08
Attending: RADIOLOGY
Payer: MEDICARE

## 2022-04-08 LAB
ALBUMIN SERPL-MCNC: 2.8 G/DL (ref 3.4–5)
ALBUMIN/GLOB SERPL: 0.8 {RATIO} (ref 1–2)
ALP LIVER SERPL-CCNC: 48 U/L
ALT SERPL-CCNC: 12 U/L
ANION GAP SERPL CALC-SCNC: 8 MMOL/L (ref 0–18)
AST SERPL-CCNC: 14 U/L (ref 15–37)
BASOPHILS # BLD AUTO: 0.02 X10(3) UL (ref 0–0.2)
BASOPHILS NFR BLD AUTO: 0.2 %
BILIRUB SERPL-MCNC: 0.8 MG/DL (ref 0.1–2)
BUN BLD-MCNC: 12 MG/DL (ref 7–18)
CALCIUM BLD-MCNC: 8.7 MG/DL (ref 8.5–10.1)
CHLORIDE SERPL-SCNC: 111 MMOL/L (ref 98–112)
CO2 SERPL-SCNC: 22 MMOL/L (ref 21–32)
CREAT BLD-MCNC: 0.35 MG/DL
EOSINOPHIL # BLD AUTO: 0.04 X10(3) UL (ref 0–0.7)
EOSINOPHIL NFR BLD AUTO: 0.4 %
ERYTHROCYTE [DISTWIDTH] IN BLOOD BY AUTOMATED COUNT: 15.9 %
GLOBULIN PLAS-MCNC: 3.3 G/DL (ref 2.8–4.4)
GLUCOSE BLD-MCNC: 96 MG/DL (ref 70–99)
HCT VFR BLD AUTO: 39.1 %
HGB BLD-MCNC: 12.8 G/DL
IMM GRANULOCYTES # BLD AUTO: 0.05 X10(3) UL (ref 0–1)
IMM GRANULOCYTES NFR BLD: 0.5 %
LYMPHOCYTES # BLD AUTO: 0.75 X10(3) UL (ref 1–4)
LYMPHOCYTES NFR BLD AUTO: 7.2 %
MAGNESIUM SERPL-MCNC: 2 MG/DL (ref 1.6–2.6)
MCH RBC QN AUTO: 32.7 PG (ref 26–34)
MCHC RBC AUTO-ENTMCNC: 32.7 G/DL (ref 31–37)
MCV RBC AUTO: 99.7 FL
MONOCYTES # BLD AUTO: 0.4 X10(3) UL (ref 0.1–1)
MONOCYTES NFR BLD AUTO: 3.8 %
NEUTROPHILS # BLD AUTO: 9.13 X10 (3) UL (ref 1.5–7.7)
NEUTROPHILS # BLD AUTO: 9.13 X10(3) UL (ref 1.5–7.7)
NEUTROPHILS NFR BLD AUTO: 87.9 %
OSMOLALITY SERPL CALC.SUM OF ELEC: 292 MOSM/KG (ref 275–295)
PHOSPHATE SERPL-MCNC: 1.7 MG/DL (ref 2.5–4.9)
PLATELET # BLD AUTO: 237 10(3)UL (ref 150–450)
POTASSIUM SERPL-SCNC: 3.5 MMOL/L (ref 3.5–5.1)
PROT SERPL-MCNC: 6.1 G/DL (ref 6.4–8.2)
RBC # BLD AUTO: 3.92 X10(6)UL
SODIUM SERPL-SCNC: 141 MMOL/L (ref 136–145)
WBC # BLD AUTO: 10.4 X10(3) UL (ref 4–11)

## 2022-04-08 PROCEDURE — 84100 ASSAY OF PHOSPHORUS: CPT | Performed by: NURSE PRACTITIONER

## 2022-04-08 PROCEDURE — 3E0T3BZ INTRODUCTION OF ANESTHETIC AGENT INTO PERIPHERAL NERVES AND PLEXI, PERCUTANEOUS APPROACH: ICD-10-PCS | Performed by: ANESTHESIOLOGY

## 2022-04-08 PROCEDURE — 0SRS0J9 REPLACEMENT OF LEFT HIP JOINT, FEMORAL SURFACE WITH SYNTHETIC SUBSTITUTE, CEMENTED, OPEN APPROACH: ICD-10-PCS | Performed by: ORTHOPAEDIC SURGERY

## 2022-04-08 PROCEDURE — 83735 ASSAY OF MAGNESIUM: CPT | Performed by: NURSE PRACTITIONER

## 2022-04-08 PROCEDURE — 76942 ECHO GUIDE FOR BIOPSY: CPT | Performed by: ANESTHESIOLOGY

## 2022-04-08 PROCEDURE — 73501 X-RAY EXAM HIP UNI 1 VIEW: CPT | Performed by: EMERGENCY MEDICINE

## 2022-04-08 PROCEDURE — 85025 COMPLETE CBC W/AUTO DIFF WBC: CPT | Performed by: NURSE PRACTITIONER

## 2022-04-08 PROCEDURE — 71045 X-RAY EXAM CHEST 1 VIEW: CPT | Performed by: RADIOLOGY

## 2022-04-08 PROCEDURE — 80053 COMPREHEN METABOLIC PANEL: CPT | Performed by: NURSE PRACTITIONER

## 2022-04-08 DEVICE — VERSYS DISTAL CENTRALIZER 12MM: Type: IMPLANTABLE DEVICE | Site: HIP | Status: FUNCTIONAL

## 2022-04-08 DEVICE — KIT FEM BONE CEMENT RESTRICTOR: Type: IMPLANTABLE DEVICE | Site: HIP | Status: FUNCTIONAL

## 2022-04-08 DEVICE — IMPLANTABLE DEVICE
Type: IMPLANTABLE DEVICE | Site: HIP | Status: FUNCTIONAL
Brand: MULTIPOLAR®

## 2022-04-08 DEVICE — REFOBACIN BC R 1X40 US: Type: IMPLANTABLE DEVICE | Site: HIP | Status: FUNCTIONAL

## 2022-04-08 DEVICE — BIPOLAR SHELL 44MM OD: Type: IMPLANTABLE DEVICE | Site: HIP | Status: FUNCTIONAL

## 2022-04-08 DEVICE — STEM FEM 130MM 13MM 9.5MM PRM: Type: IMPLANTABLE DEVICE | Site: HIP | Status: FUNCTIONAL

## 2022-04-08 RX ORDER — CEFAZOLIN SODIUM/WATER 2 G/20 ML
2 SYRINGE (ML) INTRAVENOUS EVERY 8 HOURS
Status: CANCELLED | OUTPATIENT
Start: 2022-04-08 | End: 2022-04-09

## 2022-04-08 RX ORDER — ACETAMINOPHEN 325 MG/1
650 TABLET ORAL ONCE
Status: DISCONTINUED | OUTPATIENT
Start: 2022-04-08 | End: 2022-04-08 | Stop reason: HOSPADM

## 2022-04-08 RX ORDER — BISACODYL 10 MG
10 SUPPOSITORY, RECTAL RECTAL
Status: DISCONTINUED | OUTPATIENT
Start: 2022-04-08 | End: 2022-04-13

## 2022-04-08 RX ORDER — SENNOSIDES 8.6 MG
17.2 TABLET ORAL NIGHTLY
Status: DISCONTINUED | OUTPATIENT
Start: 2022-04-08 | End: 2022-04-13

## 2022-04-08 RX ORDER — DIPHENHYDRAMINE HYDROCHLORIDE 50 MG/ML
25 INJECTION INTRAMUSCULAR; INTRAVENOUS ONCE AS NEEDED
Status: ACTIVE | OUTPATIENT
Start: 2022-04-08 | End: 2022-04-08

## 2022-04-08 RX ORDER — SODIUM PHOSPHATE, DIBASIC AND SODIUM PHOSPHATE, MONOBASIC 7; 19 G/133ML; G/133ML
1 ENEMA RECTAL ONCE AS NEEDED
Status: DISCONTINUED | OUTPATIENT
Start: 2022-04-08 | End: 2022-04-13

## 2022-04-08 RX ORDER — POLYETHYLENE GLYCOL 3350 17 G/17G
17 POWDER, FOR SOLUTION ORAL DAILY PRN
Status: DISCONTINUED | OUTPATIENT
Start: 2022-04-08 | End: 2022-04-13

## 2022-04-08 RX ORDER — SODIUM CHLORIDE, SODIUM LACTATE, POTASSIUM CHLORIDE, CALCIUM CHLORIDE 600; 310; 30; 20 MG/100ML; MG/100ML; MG/100ML; MG/100ML
INJECTION, SOLUTION INTRAVENOUS CONTINUOUS
Status: DISCONTINUED | OUTPATIENT
Start: 2022-04-08 | End: 2022-04-08 | Stop reason: HOSPADM

## 2022-04-08 RX ORDER — DOCUSATE SODIUM 100 MG/1
100 CAPSULE, LIQUID FILLED ORAL 2 TIMES DAILY
Status: DISCONTINUED | OUTPATIENT
Start: 2022-04-08 | End: 2022-04-13

## 2022-04-08 RX ORDER — ONDANSETRON 2 MG/ML
INJECTION INTRAMUSCULAR; INTRAVENOUS AS NEEDED
Status: DISCONTINUED | OUTPATIENT
Start: 2022-04-08 | End: 2022-04-08 | Stop reason: SURG

## 2022-04-08 RX ORDER — NALOXONE HYDROCHLORIDE 0.4 MG/ML
80 INJECTION, SOLUTION INTRAMUSCULAR; INTRAVENOUS; SUBCUTANEOUS AS NEEDED
Status: DISCONTINUED | OUTPATIENT
Start: 2022-04-08 | End: 2022-04-08 | Stop reason: HOSPADM

## 2022-04-08 RX ORDER — CEFAZOLIN SODIUM 1 G/3ML
INJECTION, POWDER, FOR SOLUTION INTRAMUSCULAR; INTRAVENOUS AS NEEDED
Status: DISCONTINUED | OUTPATIENT
Start: 2022-04-08 | End: 2022-04-08 | Stop reason: SURG

## 2022-04-08 RX ORDER — ONDANSETRON 2 MG/ML
4 INJECTION INTRAMUSCULAR; INTRAVENOUS AS NEEDED
Status: DISCONTINUED | OUTPATIENT
Start: 2022-04-08 | End: 2022-04-08 | Stop reason: HOSPADM

## 2022-04-08 RX ORDER — PHENYLEPHRINE HCL 10 MG/ML
VIAL (ML) INJECTION AS NEEDED
Status: DISCONTINUED | OUTPATIENT
Start: 2022-04-08 | End: 2022-04-08 | Stop reason: SURG

## 2022-04-08 RX ADMIN — CEFAZOLIN SODIUM 1 G: 1 INJECTION, POWDER, FOR SOLUTION INTRAMUSCULAR; INTRAVENOUS at 16:10:00

## 2022-04-08 RX ADMIN — PHENYLEPHRINE HCL 100 MCG: 10 MG/ML VIAL (ML) INJECTION at 15:53:00

## 2022-04-08 RX ADMIN — ONDANSETRON 4 MG: 2 INJECTION INTRAMUSCULAR; INTRAVENOUS at 17:21:00

## 2022-04-08 RX ADMIN — PHENYLEPHRINE HCL 100 MCG: 10 MG/ML VIAL (ML) INJECTION at 16:12:00

## 2022-04-08 RX ADMIN — SODIUM CHLORIDE: 9 INJECTION, SOLUTION INTRAVENOUS at 15:50:00

## 2022-04-08 NOTE — INTERVAL H&P NOTE
Pre-op Diagnosis: Hip fracture ([facility] [address] [S72.009A]    The above referenced H&P was reviewed by Diamond Mitchell MD on 4/8/2022, the patient was examined and no significant changes have occurred in the patient's condition since the H&P was performed. I discussed with the patient and/or legal representative the potential benefits, risks and side effects of this procedure; the likelihood of the patient achieving goals; and potential problems that might occur during recuperation. I discussed reasonable alternatives to the procedure, including risks, benefits and side effects related to the alternatives and risks related to not receiving this procedure. We will proceed with procedure as planned. Left hip hemiarthroplasty for left femoral neck fracture.

## 2022-04-08 NOTE — PROGRESS NOTES
04/08/22 1114   Clinical Encounter Type   Visited With Patient and family together;Health care provider  (Daughter [facility]) Zac Garcia)   Taxonomy   Intended Effects Build relationship of care and support   Methods Encourage sharing of feelings;Encourage story-telling; Offer support   Interventions Acknowledge current situation; Active listening;Explain  role; Identify supportive relationship(s); Share words of hope and inspiration   Per KARISHMA consult, introduced POLST form to the daughter [facility]). The daughter shared that she would like to take to the form her Physician for further clarifying questions. Respected her thoughts and wishes. For further spiritual care, please enter a consult in Epic or page [phone] as needed.

## 2022-04-08 NOTE — SLP NOTE
Pt remains NPO for scheduled hip repair. Will continue to follow peripherally and reassess swallow function when appropriate and available.     Jane Jiménez MA, [facility]  Pager

## 2022-04-08 NOTE — ANESTHESIA PROCEDURE NOTES
Regional Block  Performed by: Bola Roy MD  Authorized by: Bola Roy MD       General Information and Staff    Start Time:  4/8/2022 3:56 PM  End Time:  4/8/2022 4:01 PM  Anesthesiologist:  Bola Roy MD  Performed by: Anesthesiologist  Patient Location:  OR      Site Identification: real time ultrasound guided and image stored and retrievable    Block site/laterality marked before start: site marked  Reason for Block: at surgeon's request and post-op pain management    Preanesthetic Checklist: 2 patient identifers, IV checked, risks and benefits discussed, monitors and equipment checked, pre-op evaluation, timeout performed, anesthesia consent, sterile technique used, no prohibitive neurological deficits and no local skin infection at insertion site      Procedure Details    Patient Position:  Right lateral decubitus  Prep: ChloraPrep    Monitoring:  Cardiac monitor, continuous pulse ox and blood pressure cuff  Block Type:  Femoral  Laterality:  Left  Injection Technique:  Single-shot    Needle    Needle Type:  Short-bevel and echogenic  Needle Gauge:  21 G  Needle Length:  100 mm  Needle Localization:  Ultrasound guidance  Reason for Ultrasound Use: appropriate spread of the medication was noted in real time and no ultrasound evidence of intravascular and/or intraneural injection            Assessment    Injection Assessment:  Good spread noted, negative resistance, negative aspiration for heme, incremental injection and low pressure  Heart Rate Change: No    - Patient tolerated block procedure well without evidence of immediate block related complications.      Medications      Additional Comments    Medication:  Ropivacaine 0.375% 20mL

## 2022-04-08 NOTE — PLAN OF CARE
Pt received after change of shift report. Pt alert and oriented to self. Pt received in mitts. Intermittently following commands. Afebrile. Sinus tachycardia  noted on telemetry strip. Blood pressure stable. Pt received on 3L nasal cannula. Chest tube in place and to water seal. Pt NPO. Swabs PRN. No BM's. Straight cath PRN. Bladder scan PRN. Family called and updated on pt status and plan of care. Transfer orders in place. Report given to floor RN. Pt transferred off at .

## 2022-04-08 NOTE — PLAN OF CARE
Assumed care 0900. Patient alert to self. Denies any complaint of pain. Vitals stable. Reddy placed per order. Plan for surgery today, NPO. family at bedside.  Will continue to monitor

## 2022-04-08 NOTE — IMAGING NOTE
Patient received in CT room 1   Consent received from POA daughter  DR Erick Hilton placed left pleural chest tube with local anesthesia only. Patient tolerated procedure well. Patient transported to  accompanied by this RN, daughter and transport  SBAR  Report To Farooq RN.

## 2022-04-08 NOTE — ANESTHESIA PROCEDURE NOTES
Airway  Date/Time: 4/8/2022 3:53 PM  Urgency: elective    Airway not difficult    General Information and Staff    Patient location during procedure: OR  Anesthesiologist: Lainey Odonnell MD  Performed: anesthesiologist     Indications and Patient Condition  Indications for airway management: anesthesia  Spontaneous ventilation: present  Sedation level: deep  Preoxygenated: yes  Patient position: sniffing  Mask difficulty assessment: 1 - vent by mask  Planned trial extubation    Final Airway Details  Final airway type: endotracheal airway      Successful airway: ETT  Cuffed: yes   Successful intubation technique: direct laryngoscopy  Endotracheal tube insertion site: oral  Blade: Nicholas  Blade size: #3  ETT size (mm): 7.0    Cormack-Lehane Classification: grade IIA - partial view of glottis  Placement verified by: chest auscultation and capnometry   Cuff volume (mL): 4  Measured from: lips  Number of attempts at approach: 1    Additional Comments  Glottis with thick green secretions throughout

## 2022-04-08 NOTE — PLAN OF CARE
Resumed patient care at 0. Patient is awake, alert but confused. Went to CT for chest tube placement. Back at 2050, chest tube to water seal. CT site dry intact. Vital signs remains stable. Family went home for the night. Bladder scan and PRN straight cath. Patient is constantly touching and taking out tubes and wires. Mitts applied. Will continue to monitor the patient.

## 2022-04-08 NOTE — BRIEF OP NOTE
Pre-Operative Diagnosis: Hip fracture ([facility] [address] [S72.009A]     Post-Operative Diagnosis: Hip fracture ([address] [S72.009A]      Procedure Performed:    LEFT HIP BIPOLAR HEMIARTHROPLASTY    Surgeon(s) and Role:     Doe Funez MD - Primary    Assistant(s):  PA: Iris Tejada PA-C     Surgical Findings: See operative report      Specimen: left femoral head bone     Estimated Blood Loss: Blood Output: 150 mL (4/8/2022  5:43 PM)      Nancy Galeano PA-C  4/8/2022  5:51 PM

## 2022-04-08 NOTE — PROCEDURES
[facility]  Procedure Note    May Sheriff Patient Status:  Inpatient    1927 MRN [id number]   [facility] 4SW-A Attending Narciso Mcrae MD   Hosp Day # 2 PCP Tanvi Randall MD     Procedure: CT guided left chest tube placement    Pre-Procedure Diagnosis:  Left pneumothorax    Post-Procedure Diagnosis: same    Anesthesia:  Local    Findings:  Chest tube in small pneumothorax    Specimens: none    Blood Loss:  <10 ml    Tourniquet Time: n/a  Complications:  None  Drains:  8.5 Fr left chest tube    Secondary Diagnosis:  none    Francisco Navarrete MD  2022

## 2022-04-09 ENCOUNTER — APPOINTMENT (OUTPATIENT)
Dept: GENERAL RADIOLOGY | Facility: HOSPITAL | Age: 87
End: 2022-04-09
Attending: PHYSICIAN ASSISTANT
Payer: MEDICARE

## 2022-04-09 LAB
ALBUMIN SERPL-MCNC: 2.5 G/DL (ref 3.4–5)
ALBUMIN/GLOB SERPL: 0.9 {RATIO} (ref 1–2)
ALP LIVER SERPL-CCNC: 41 U/L
ALT SERPL-CCNC: 12 U/L
ANION GAP SERPL CALC-SCNC: 14 MMOL/L (ref 0–18)
AST SERPL-CCNC: 18 U/L (ref 15–37)
BASOPHILS # BLD AUTO: 0.01 X10(3) UL (ref 0–0.2)
BASOPHILS NFR BLD AUTO: 0.1 %
BILIRUB SERPL-MCNC: 0.8 MG/DL (ref 0.1–2)
BUN BLD-MCNC: 11 MG/DL (ref 7–18)
CALCIUM BLD-MCNC: 8.3 MG/DL (ref 8.5–10.1)
CHLORIDE SERPL-SCNC: 112 MMOL/L (ref 98–112)
CO2 SERPL-SCNC: 18 MMOL/L (ref 21–32)
CREAT BLD-MCNC: 0.38 MG/DL
EOSINOPHIL # BLD AUTO: 0 X10(3) UL (ref 0–0.7)
EOSINOPHIL NFR BLD AUTO: 0 %
ERYTHROCYTE [DISTWIDTH] IN BLOOD BY AUTOMATED COUNT: 15.9 %
GLOBULIN PLAS-MCNC: 2.8 G/DL (ref 2.8–4.4)
GLUCOSE BLD-MCNC: 114 MG/DL (ref 70–99)
HCT VFR BLD AUTO: 33.2 %
HGB BLD-MCNC: 10.6 G/DL
IMM GRANULOCYTES # BLD AUTO: 0.05 X10(3) UL (ref 0–1)
IMM GRANULOCYTES NFR BLD: 0.6 %
LYMPHOCYTES # BLD AUTO: 0.49 X10(3) UL (ref 1–4)
LYMPHOCYTES NFR BLD AUTO: 5.9 %
MAGNESIUM SERPL-MCNC: 1.8 MG/DL (ref 1.6–2.6)
MCH RBC QN AUTO: 32.6 PG (ref 26–34)
MCHC RBC AUTO-ENTMCNC: 31.9 G/DL (ref 31–37)
MCV RBC AUTO: 102.2 FL
MONOCYTES # BLD AUTO: 0.44 X10(3) UL (ref 0.1–1)
MONOCYTES NFR BLD AUTO: 5.3 %
NEUTROPHILS # BLD AUTO: 7.35 X10 (3) UL (ref 1.5–7.7)
NEUTROPHILS # BLD AUTO: 7.35 X10(3) UL (ref 1.5–7.7)
NEUTROPHILS NFR BLD AUTO: 88.1 %
OSMOLALITY SERPL CALC.SUM OF ELEC: 298 MOSM/KG (ref 275–295)
PHOSPHATE SERPL-MCNC: 2.2 MG/DL (ref 2.5–4.9)
PLATELET # BLD AUTO: 255 10(3)UL (ref 150–450)
POTASSIUM SERPL-SCNC: 3.1 MMOL/L (ref 3.5–5.1)
PROT SERPL-MCNC: 5.3 G/DL (ref 6.4–8.2)
RBC # BLD AUTO: 3.25 X10(6)UL
SODIUM SERPL-SCNC: 144 MMOL/L (ref 136–145)
WBC # BLD AUTO: 8.3 X10(3) UL (ref 4–11)

## 2022-04-09 PROCEDURE — 85025 COMPLETE CBC W/AUTO DIFF WBC: CPT | Performed by: PHYSICIAN ASSISTANT

## 2022-04-09 PROCEDURE — 83735 ASSAY OF MAGNESIUM: CPT | Performed by: PHYSICIAN ASSISTANT

## 2022-04-09 PROCEDURE — 73501 X-RAY EXAM HIP UNI 1 VIEW: CPT | Performed by: PHYSICIAN ASSISTANT

## 2022-04-09 PROCEDURE — 80053 COMPREHEN METABOLIC PANEL: CPT | Performed by: PHYSICIAN ASSISTANT

## 2022-04-09 PROCEDURE — 84100 ASSAY OF PHOSPHORUS: CPT | Performed by: PHYSICIAN ASSISTANT

## 2022-04-09 PROCEDURE — 71045 X-RAY EXAM CHEST 1 VIEW: CPT | Performed by: PHYSICIAN ASSISTANT

## 2022-04-09 RX ORDER — POTASSIUM CHLORIDE 14.9 MG/ML
20 INJECTION INTRAVENOUS ONCE
Status: COMPLETED | OUTPATIENT
Start: 2022-04-09 | End: 2022-04-10

## 2022-04-09 RX ORDER — KETOROLAC TROMETHAMINE 15 MG/ML
15 INJECTION, SOLUTION INTRAMUSCULAR; INTRAVENOUS EVERY 6 HOURS PRN
Status: DISPENSED | OUTPATIENT
Start: 2022-04-09 | End: 2022-04-11

## 2022-04-09 RX ORDER — SODIUM CHLORIDE 9 MG/ML
INJECTION, SOLUTION INTRAVENOUS CONTINUOUS
Status: DISCONTINUED | OUTPATIENT
Start: 2022-04-09 | End: 2022-04-13

## 2022-04-09 NOTE — PHYSICAL THERAPY NOTE
Order received for PT Evaluation. Chart reviewed and spoke to RN. RN reports pt not appropriate for PT eval at this time,  family is to discuss hospice. Will follow up to check plan and proceed with PT evaluation if/when appropriate.

## 2022-04-09 NOTE — SLP NOTE
RN reports pt not appropriate for swallow eval at this time. Family to discuss hospice. Will await plan and evaluate when and if appropriate.

## 2022-04-09 NOTE — PLAN OF CARE
Received pt at 1930  Pt alert to self, ST, 2L NC, VSS  Pt drowsy but easily arousable to speech  No current c/o pain  L leg dressing intact. Accordion drain w/ minimal bloody output  CT to water seal  IV abx  D/c Planning: Repeat CXR 4/9, video swallow, family to meet with palliative   Call light within reach. Needs currently met       Problem: PAIN - ADULT  Goal: Verbalizes/displays adequate comfort level or patient's stated pain goal  Description: INTERVENTIONS:  - Encourage pt to monitor pain and request assistance  - Assess pain using appropriate pain scale  - Administer analgesics based on type and severity of pain and evaluate response  - Implement non-pharmacological measures as appropriate and evaluate response  - Consider cultural and social influences on pain and pain management  - Manage/alleviate anxiety  - Utilize distraction and/or relaxation techniques  - Monitor for opioid side effects  - Notify MD/LIP if interventions unsuccessful or patient reports new pain  - Anticipate increased pain with activity and pre-medicate as appropriate  Outcome: Progressing     Problem: SAFETY ADULT - FALL  Goal: Free from fall injury  Description: INTERVENTIONS:  - Assess pt frequently for physical needs  - Identify cognitive and physical deficits and behaviors that affect risk of falls.   - [place] fall precautions as indicated by assessment.  - Educate pt/family on patient safety including physical limitations  - Instruct pt to call for assistance with activity based on assessment  - Modify environment to reduce risk of injury  - Provide assistive devices as appropriate  - Consider OT/PT consult to assist with strengthening/mobility  - Encourage toileting schedule  Outcome: Progressing     Problem: RESPIRATORY - ADULT  Goal: Achieves optimal ventilation and oxygenation  Description: INTERVENTIONS:  - Assess for changes in respiratory status  - Assess for changes in mentation and behavior  - Position to facilitate oxygenation and minimize respiratory effort  - Oxygen supplementation based on oxygen saturation or ABGs  - Provide Smoking Cessation handout, if applicable  - Encourage broncho-pulmonary hygiene including cough, deep breathe, Incentive Spirometry  - Assess the need for suctioning and perform as needed  - Assess and instruct to report SOB or any respiratory difficulty  - Respiratory Therapy support as indicated  - Manage/alleviate anxiety  - Monitor for signs/symptoms of CO2 retention  Outcome: Progressing     Problem: Delirium  Goal: Minimize duration of delirium  Description: Interventions:  - Encourage use of hearing aids, eye glasses  - Promote highest level of mobility daily  - Provide frequent reorientation  - Promote wakefulness i.e. lights on, blinds open  - Promote sleep, encourage patient's normal rest cycle i.e. lights off, TV off, minimize noise and interruptions  - Encourage family to assist in orientation and promotion of home routines  Outcome: Progressing     Problem: Safety Risk - Non-Violent Restraints  Goal: Patient will remain free from self-harm  Description: INTERVENTIONS:  - Apply the least restrictive restraint to prevent harm  - Notify patient and family of reasons restraints applied  - Assess for any contributing factors to confusion (electrolyte disturbances, delirium, medications)  - Discontinue any unnecessary medical devices as soon as possible  - Assess the patient's physical comfort, circulation, skin condition, hydration, nutrition and elimination needs   - Reorient and redirection as needed  - Assess for the need to continue restraints  Outcome: Progressing

## 2022-04-09 NOTE — PLAN OF CARE
GCS 13. Lethargic. Arouses to voice. Reporting pain to left hip. PRN Tylenol and iv toradol given with some relief. ST on tele. Still too lethargic for po intake. Speech to re-evaluate in the morning. Frequent oral suctioning required this afternoon with thick yellow secretions. Lungs remain diminished. CT intact and now to -20 suction with 10 ml of serous drainage this shift. Reddy intact with minimal urine. Ivf started. IV abx continued, Left hip dressing changed and Hemovac removed by ortho. Hip precautions maintained. Remains free from falls and injuries. Will continue to monitor.

## 2022-04-09 NOTE — PROGRESS NOTES
Patient is a 27-year-old white female 1 day status post hemiarthroplasty of her left hip. Patient's daughter is with her today. Patient is sleeping but arousable. No significant complaints of pain. Patient is exam shows her drain and dressing to be in place. Patient has minimal pain associated with passive range of motion of the left hip. Good cap refill in the lower extremities. Patient does have active motion of the left foot. Hemovac with mild output. Impression is that a normal postop left hip hemiarthroplasty. Recommendations are for her to be up with therapy today. She can be weightbearing as tolerated left lower extremity. Maintain hip precautions well and chair. May roll to side in bed to put a pillow between her legs. Discharge planning for subacute rehab or for home with home health. This decision was based upon patient's ability to ambulate with minimal assist.  Physical therapy to help with assessment in this regards.

## 2022-04-09 NOTE — OPERATIVE REPORT
[facility]    PATIENT'S NAME: Cherise Talavera   ATTENDING PHYSICIAN: Nicolasa Bradshaw M.D. OPERATING PHYSICIAN: Lorna Umana M.D. PATIENT ACCOUNT#:   [de-identified]    LOCATION:  [address] [place] [address]  MEDICAL RECORD #:   [id number]       YOB: 1927  ADMISSION DATE:       04/05/2022      OPERATION DATE:  04/08/2022    OPERATIVE REPORT      PREOPERATIVE DIAGNOSIS:  Left femoral neck fracture. POSTOPERATIVE DIAGNOSIS:  Left femoral neck fracture. PROCEDURE:  Hemiarthroplasty, left hip. ASSISTANT:  Sandra Marcano PA-C    ANESTHESIA:  General plus nerve block. OPERATIVE TECHNIQUE:  Following administration of anesthesia, the patient was placed in the right lateral decubitus position with an axillary roll beneath the right lateral thorax. Patient was kept in the decubitus position with a padded pegboard. The left hip and left lower extremity were prepped and draped in the usual sterile fashion. The #10 blade was used to make a curvilinear incision on the lateral aspect of the hip measuring approximately 10 cm in length. The incision was carried down through the dermis, subcutaneous tissues down to the iliotibial band and gluteus dread fascia. These were incised in line with the skin incision. A Charnley C retractor was used to retract these anteriorly and posteriorly. The short external rotators were then released off the proximal femur, exposing the capsule. The capsule was incised exposing the femoral neck fracture. The capsule was incised in a T-fashion in the posterior aspect. The corners of the capsule were tagged with #5 Ethibond sutures. The femoral head was removed using the corkscrew retractor. The attention was then paid to the femoral neck, and this was cut approximately 1.5 cm proximal to the lesser trochanter using the oscillating saw. The acetabulum was evacuated of any bone fragments. The ligamentum teres was also released.   The attention was then paid to the femur and the box chisel was used to remove the superior neck of the femur. The T-handle reamer was used to open the canal.  The lateralizer was used to ream the lateral trochanteric region. Broaching was then begun with a size 9 broach, advancing up to a size 13 broach. This had excellent fill. The trial standard offset neck was placed and the 44 mm outer diameter and 28 mm inner diameter head was placed with the initially 0+ neck and then going to the -3.5 neck. After the 3.5 neck was applied, the hip was well reduced and stable and allowed for full range of motion. The trials were then removed. The cement restrictor was placed distally about 2 cm distal to the anticipated tip of the prosthesis. The pulsatile lavage was used to lavage the proximal femur. The bone cement was mixed and then pressurized in the canal.  The cemented standard neck offset stem was placed and this was a 13. Pressure was applied during the curing phase and excess cement removed around the perimeter. One additional note, a centralizer was placed around the distal aspect of the stem. After bone cement had cured, the 28 mm, -3.5 head was placed in the outer shell and liner was placed as well. This was then reduced. The hip was stable. The capsule was repaired using the #5 Ethibond sutures. The piriformis was repaired to the posterior piriformis fossa region through drill holes in the posterior aspect of the greater trochanter. After this was completed, the iliotibial band and gluteus dread fascia were closed using #1 Vicryl sutures. The hip was irrigated at this time with pulsatile lavage. A medium Hemovac was placed in the subcutaneous space and the subcutaneous tissues were closed with 2-0 Vicryl sutures in interrupted fashion. Skin was closed with stainless steel staples. The wounds were covered with Betadine ointment, Adaptic gauze, 4 x 4 gauze, ABD dressing, and foam tape.   The x-rays postop showed the components in good position. No complications. The patient was transferred to the postanesthesia recovery room in stable condition. BLOOD LOSS:  150 mL. INTRAVENOUS FLUIDS:  All crystalloid. COMPLICATIONS:  No complications. SPECIMENS:  Femoral head from the left hip.      Dictated By Alda Steven M.D.  d: 04/08/2022 17:40:10  t: 04/09/2022 04:11:52  [facility] [id number]  RE/

## 2022-04-10 ENCOUNTER — APPOINTMENT (OUTPATIENT)
Dept: GENERAL RADIOLOGY | Facility: HOSPITAL | Age: 87
End: 2022-04-10
Attending: INTERNAL MEDICINE
Payer: MEDICARE

## 2022-04-10 ENCOUNTER — APPOINTMENT (OUTPATIENT)
Dept: GENERAL RADIOLOGY | Facility: HOSPITAL | Age: 87
End: 2022-04-10
Attending: PHYSICIAN ASSISTANT
Payer: MEDICARE

## 2022-04-10 LAB
ANION GAP SERPL CALC-SCNC: 11 MMOL/L (ref 0–18)
BUN BLD-MCNC: 16 MG/DL (ref 7–18)
CALCIUM BLD-MCNC: 8.4 MG/DL (ref 8.5–10.1)
CHLORIDE SERPL-SCNC: 115 MMOL/L (ref 98–112)
CO2 SERPL-SCNC: 21 MMOL/L (ref 21–32)
CREAT BLD-MCNC: 0.58 MG/DL
GLUCOSE BLD-MCNC: 103 MG/DL (ref 70–99)
OSMOLALITY SERPL CALC.SUM OF ELEC: 305 MOSM/KG (ref 275–295)
PHOSPHATE SERPL-MCNC: 2.7 MG/DL (ref 2.5–4.9)
POTASSIUM SERPL-SCNC: 3.5 MMOL/L (ref 3.5–5.1)
POTASSIUM SERPL-SCNC: 3.5 MMOL/L (ref 3.5–5.1)
SODIUM SERPL-SCNC: 147 MMOL/L (ref 136–145)

## 2022-04-10 PROCEDURE — 84132 ASSAY OF SERUM POTASSIUM: CPT | Performed by: HOSPITALIST

## 2022-04-10 PROCEDURE — 97530 THERAPEUTIC ACTIVITIES: CPT

## 2022-04-10 PROCEDURE — 97116 GAIT TRAINING THERAPY: CPT

## 2022-04-10 PROCEDURE — 92610 EVALUATE SWALLOWING FUNCTION: CPT

## 2022-04-10 PROCEDURE — 80048 BASIC METABOLIC PNL TOTAL CA: CPT | Performed by: HOSPITALIST

## 2022-04-10 PROCEDURE — 84100 ASSAY OF PHOSPHORUS: CPT | Performed by: HOSPITALIST

## 2022-04-10 PROCEDURE — 97162 PT EVAL MOD COMPLEX 30 MIN: CPT

## 2022-04-10 PROCEDURE — 71045 X-RAY EXAM CHEST 1 VIEW: CPT | Performed by: PHYSICIAN ASSISTANT

## 2022-04-10 PROCEDURE — 71045 X-RAY EXAM CHEST 1 VIEW: CPT | Performed by: INTERNAL MEDICINE

## 2022-04-10 NOTE — PROGRESS NOTES
Patient is a 80-year-old white female now 2 days postop left hip hemiarthroplasty. Patient is in bed. Patient's daughter is with her. Patient is somnolent but arousable. Patient does respond to her name and also instructions. Patient's exam shows her to have mild pain with passive range of motion of the left hip. Leg lengths are equal.  External rotation and internal rotation equal.    Impression is that of slow progressive healing status post hemiarthroplasty left hip. Recommendations are for subacute rehab. Patient is scheduled for a barium swallow today. Patient is okay to be transferred to subacute rehab when medically stable.

## 2022-04-10 NOTE — CM/SW NOTE
Order noted for hospice consult--spoke also with patient's nurse.     Message left on residential hospice referral line (); aidin referral also sent to residential hospice

## 2022-04-10 NOTE — PLAN OF CARE
Assumed care at [facility] on tele, RA  Weak, congested cough- PRN suctioning  Lethargic- arouses to voice, nods yes/no  CT to -20 suction- intact, 10ml serous output overnight  Reddy intact  IVF and IV abx infusing per order  L hip dressing intact  Hip precautions maintained  POC discussed with pt and family

## 2022-04-10 NOTE — HOSPICE RN NOTE
[facility]  Met with patient's daughters Rossi Solorzano and Monserrat Campbell. Hospice philosophy and services explained. Comfort vs. Curative care reviewed. Goal is to have patient return home with hospice but Rossi Solorzano would like to have patient receive PT for a few days while here in the hospital and get stronger to go home. Patient has 7 daughters who would take turns staying with patient in her home and provide care. Hospice book provided to Rossi Solorzano. All questions about hospice care answered. 24 hour phone number provided to Rossi Jeanine for any further questions. Hospice to follow up daily to see if family has decided on comfort care.

## 2022-04-11 LAB
ERYTHROCYTE [DISTWIDTH] IN BLOOD BY AUTOMATED COUNT: 16.3 %
HCT VFR BLD AUTO: 30.6 %
HGB BLD-MCNC: 9.6 G/DL
MCH RBC QN AUTO: 32.3 PG (ref 26–34)
MCHC RBC AUTO-ENTMCNC: 31.4 G/DL (ref 31–37)
MCV RBC AUTO: 103 FL
PLATELET # BLD AUTO: 238 10(3)UL (ref 150–450)
RBC # BLD AUTO: 2.97 X10(6)UL
WBC # BLD AUTO: 7.1 X10(3) UL (ref 4–11)

## 2022-04-11 PROCEDURE — 97530 THERAPEUTIC ACTIVITIES: CPT

## 2022-04-11 PROCEDURE — 97166 OT EVAL MOD COMPLEX 45 MIN: CPT

## 2022-04-11 PROCEDURE — 85027 COMPLETE CBC AUTOMATED: CPT | Performed by: HOSPITALIST

## 2022-04-11 NOTE — DIETARY NOTE
Nutrition Note      Per chart, pt family would like to pursue comfort care exclusively with hospice and hospice on consult. Due to change in plan of care, will defer nutrition reassessment at this time and await hospice consult.  Should pt decide on hospice, will d/c pt from nutrition caseload      [address] [facility], C/ Richard Ruby

## 2022-04-11 NOTE — PROGRESS NOTES
Patient is a 58-year-old white female now about 3 days post left hip hemiarthroplasty. Patient is more alert and communicative today. One of her family members is with her today. Patient's exam shows her to have mild pain with passive range of motion left hip. There is mild serous drainage from the left hip drain site. No fluctuance noted. Minimal swelling of the thigh. Mild edema of the left leg. Impression is that of slow improving recovery from left hip hemiarthroplasty. Recommendations are to reinforce the dressings for the time being and see if the drainage subsides and ceases on its own. Also recommended either subacute rehab or if patient and family want to go home we need to ascertain whether they are able to care for the patient in combination with hospice care. We will follow-up with the patient tomorrow. Reinforce dressing as needed. Wait for therapy's opinion regarding disposition.

## 2022-04-11 NOTE — PLAN OF CARE
Assumed care at 0700  Patient lethargic, opens eyes to voice  Pills taken crushed in applesauce  Worked with PT. Max assist to chair  toradol and tylenol given for pain  2L O2 at times  L hip drain site leaking.  Gauze changed prn  Plan of home with hospice    Plan of care discussed with daughters at bedside

## 2022-04-11 NOTE — PLAN OF CARE
Assumed care at [address]  SR/ST on tele, 2L NC  Lethargic, arouses to voice- more awake this morning  Weak, congested cough- PRN suctioning  PRN tylenol and toradol for pain  Tolerating diet  Reddy intact  IVF and IV abx infusing per order  L hip dressing c/d/i  CT site c/d/i  Hip precautions maintained  POC discussed with pt and family

## 2022-04-11 NOTE — PROGRESS NOTES
Anesthesia Pain Service    POD#3 s/p Other: left hip hemiarthroplasty    Block type: Lower extremity: Femoral    Laterality: left    Injection technique: Single shot    Post op review: No evidence of immediate block related complications

## 2022-04-11 NOTE — HOSPICE RN NOTE
[facility] nursing follow up on hospice consult order. Family at bedside. Questions and concerns addressed. Family hoping patient condition will improve at bit and be able to go home with hospice. Family has [facility] informational booklet. RH will continue to follow as necessary and assist with discharge planning if family decides to move forward with [facility] at time of discharge. Hospice liaison will follow up with this family again tomorrow.

## 2022-04-11 NOTE — PLAN OF CARE
GCS 13-14. Drowsy but arouses to voice. Denying pain. ST on tele. Reddy intact with improved urine output. I VF and IV abx continued. Placed on a pureed diet with NTL. Tolerating applesauce and NTL. Left hip dressing remains c/d/i. Hip precautions maintained. Worked with pt and sat in the chair today for an hour. Remains free from falls and injuries. Will continue to monitor.

## 2022-04-12 PROCEDURE — 92526 ORAL FUNCTION THERAPY: CPT

## 2022-04-12 NOTE — HOSPICE RN NOTE
[facility] Follow Up. Residential saw patient and daughter Leata Room at bedside who is declining hospice at this time. Family will reach out to [facility] in the future with any additional needs. Hospice Non-Admit.

## 2022-04-12 NOTE — PROGRESS NOTES
Patient is a 80-year-old white female now 4 days postop from a hemiarthroplasty of her left hip. Patient is resting comfortably in her bed. No family members with her this evening. Patient is arousable. Patient does answer simple questions. Patient's exam shows her to have mild pain with passive range of motion left hip. No guarding with range of motion of the left hip. Patient does have active dorsiflexion and plantarflexion of her feet. Dressing intact left hip. No active drainage. Impression is that a normal postop hemiarthroplasty left hip. Second impression is that of dementia. Patient is scheduled for transfer to subacute rehab facility. We will follow the patient while she is in the hospital and then will need to see her in approximately 2 weeks post discharge. Patient is in hospice then staples can be removed at the nursing facility or at home by a qualified nurse or nurse assistant.

## 2022-04-12 NOTE — CM/SW NOTE
Pt discussed in rounds, family inquiring on [facility]. SW met with pt/dtr at bedside. Pt asleep throughout conversation. Discussed dc options/plan. dtr reports they had a family meeting yesterday and family unsure on POC. They have hope for [facility]ORIS for pt to perform at [facility]-- dtr reports pt did well yesterday, however today has been more lethargic. Discussed if [facility]- would pt/family want rehab vs restorative. If Home: would pt/family want palliative care with restorative VS home with hospice. If BORIS, family looking into [facility] or [facility]. SW will send referrals to those mentioned as well as in [place] area to see availability. Will check in later with available providers. RN aware.     [address], LSW

## 2022-04-12 NOTE — PLAN OF CARE
Assumed care at [address]   A&Ox1  Lethargic  2 L NC   IVF infusing per protocol   ABX given per MD order  Bed rest   L hip incision dressing changed   ABD pillow in place      POC discussed with daughter at bedside

## 2022-04-12 NOTE — CM/SW NOTE
[facility] able to accept, will try for insurance auth as PT recommending [facility]. If not private pay: rate: $345/day private and  $360 for suite. LIA met with pt/dtr Adalgisa Obregon at bedside. Family decided against hospice at this time. Adalgisa Obregon called [facility] for discussion- aware of insurance vs private pay. Family aware that as we are requesting insurance auth this will cause a delay in dc. Family aware. SW confirmed with [facility] on family plan, awaiting auth.      [facility], [facility] LAY:[id number]      CANDI Rosen

## 2022-04-12 NOTE — PLAN OF CARE
Assumed care at 0700  Patient lethargic, opens eyes to voice  Nods appropriately  Up to chair with x2 max assist today  Worked with speech therapy. Pills taken crushed in applesauce  L drain site changed x1    Family decided BORIS instead of hospice  Plan to dc to [address] [place] [address].  Awaiting insurance auth

## 2022-04-13 VITALS
WEIGHT: 94.56 LBS | HEART RATE: 90 BPM | DIASTOLIC BLOOD PRESSURE: 66 MMHG | RESPIRATION RATE: 18 BRPM | BODY MASS INDEX: 16.75 KG/M2 | HEIGHT: 63 IN | SYSTOLIC BLOOD PRESSURE: 160 MMHG | TEMPERATURE: 98 F | OXYGEN SATURATION: 93 %

## 2022-04-13 PROCEDURE — 92526 ORAL FUNCTION THERAPY: CPT

## 2022-04-13 NOTE — CM/SW NOTE
3:00- received call from Leatha Gaucher B at [facility]   ([phone]). Sent referral via email in [employer] to [email or web address]. Kimberlee Gaucher will talk with dtr Anisa Gandhi, as Anisa Gandhi [facility]), is agreeable to hospice they may be able to sign consents electronically. Possibility of at least hospital bed delivery tonght--if not tomorrow. Kimberlee Gaucher made aware of other DME needs, but could be delivered tomorrow. ADDENDUM:   4:00-  Anisa Gandhi called reporting she has signed consents with [facility]. DME to be delivered tomorrow to home. AMB set up for 6pm, new PCS completed. RN made aware of final dc plan.      CANDI Weaver

## 2022-04-13 NOTE — PLAN OF CARE
Assumed patient care this morning. Alert, to drowsy at times. Following simple commands. Non-verbal, but nods appropriately. Tele NSR. Left hip with surgical site with dressing cdi, abductor pillow in place. Modified diet, adjusted per speech therapy today. Up in chair with max assist, but only tolerated about 20 mins. Daughter Anisa Gandhi at bedside this morning, requesting to take patient home with home health services and would continue to stay in touch with hospice for potential transition to hospice once at home depending on home course. Seen by  with patient going to [facility] for [facility]hab today.

## 2022-04-13 NOTE — PLAN OF CARE
Discharge plan changed throughout the day; patient will be going home with home hospice services today, will go with kalli. Dr. Josue Ha notified. Discharge instructions reviewed with Roshan Jiménez/POA. Abductor pillow in place for hip alignment for home. Ambulance crew here to take home.

## 2022-04-13 NOTE — CM/SW NOTE
Pt discussed in rounds, per RN report family wanting to dc pt home with HH. LIA met with pt and dtrs at bedside, discussed dc options. Pt would need DME at home- hospital bed/wheelchair. Discussed HH with Palliative Care oversight for guidance on approach for hospice. family wanting HH for education on transfers and caring for mom as PLOF was a min assist with transfers. Family asking if [facility] has a [facility]- sw to inquire as it is known as a hospice/pc company. Family aware that once pt is active with hospice then they will order DME and the previously ordered DME through hospital will need to be taken back. Family provided understanding. LIA encouraged family to go to [facility] in [facility] for wheelchair/commode as it is not likely wheelchair will be delivered when pt is dc. LIA started referrals for DME and sent referral to [facility]. While in referral, received message that [facility] received insurance auth. LIA met with dtrs and pt at bedside. Discussed a possible dc option of going to [facility] for [facility]. [facility] for rehab and family education on transfers. This option not only gives family time to organize home for pt but will also give pt a chance at rehab. Family agreeable. LIA confirmed dc today at [facility]. [facility] for 3pm, LIA notified DME companies and [facility] on [facility]. LIA set up ambulance for 3pm, PCS completed. RN aware of dc plan.      Hector Montgomery, LSW

## 2022-04-13 NOTE — PLAN OF CARE
Assumed care at [address].    A&Ox1, lethargic, nods appropriately, RA, NSR on tele  Voiding per mahan   L hip dressing; C/D/I  Call light within reach    Patient updated on plan of care

## 2022-04-13 NOTE — CM/SW NOTE
Received VM from dtr, Yared Mijares at 12:49 stating family now wanting to dc pt home and to cancel [facility]. LIA called Yared Mijares back to discuss. Family in disagreement on going to [facility]. Will go on [facility]-- SW reached out to more [facility] as [facility] is strictly hospice. LIA "[employer]-- in network, seeing if they can deliver bed today. Set up transport for 6pm, home. New PCS completed. LIA called dtr back to confirm dc for 6. Awaiting response if [facility] can deliver today. Dtr stated that she would just like to contact hospice \"despite what my sisters say. \"     SW called [facility] [phone])) for referral.     1:50- LIA had message from [facility] stating if deliver can occur tomorrow (4/14), [facility] did leave message with dtr as well. Donna unable to deliver bed until tomorrow. Received call back from Ang at [facility]-- referral sent in [address] [place] [address]. LIA called dtr back for updates. Discussed Medicare rights and appeal process-- may have to appeal dependent on delivery status of DME and hospice.        ADDENDUM: ANASTASIYA LAYTON

## 2022-04-14 ENCOUNTER — PATIENT OUTREACH (OUTPATIENT)
Dept: CASE MANAGEMENT | Age: 87
End: 2022-04-14

## 2022-04-14 NOTE — PROGRESS NOTES
ADRIÁN s/w pt's daughter/POA Aidee Reeves. She states that pt will be following with [facility], who are coming today. Hospital bed is also being delivered today. She states that patient not eating but taking sips of water. She states that she does not believe pt has much longer and will not need an appt with PCP. ADRIÁN offered support. She was thankful and denied having any questions or concerns.

## 2022-04-14 NOTE — PAYOR COMM NOTE
Discharge Notification    Patient Name: Valentin Khan: [facility] #: [id number]  Authorization Number: [id number]  Admit Date/Time: 4/5/2022 9:49 AM  Discharge Date/Time: 4/13/2022 6:28 PM

## 2022-04-18 ENCOUNTER — TELEPHONE (OUTPATIENT)
Dept: FAMILY MEDICINE CLINIC | Facility: CLINIC | Age: 87
End: 2022-04-18

## (undated) DEVICE — PILLOW ABDUCTION HIP MED

## (undated) DEVICE — LIGHT HANDLE

## (undated) DEVICE — HOOD: Brand: FLYTE

## (undated) DEVICE — SUTURE ETHIBOND 5 V-37

## (undated) DEVICE — 1200CC GUARDIAN II: Brand: GUARDIAN

## (undated) DEVICE — HOOD, PEEL-AWAY: Brand: FLYTE

## (undated) DEVICE — CHLORAPREP 26ML APPLICATOR

## (undated) DEVICE — STERILE POLYISOPRENE POWDER-FREE SURGICAL GLOVES: Brand: PROTEXIS

## (undated) DEVICE — SPECIMEN TRAP LUKI

## (undated) DEVICE — SINGLE USE BIOPSY VALVE MAJ-210: Brand: SINGLE USE BIOPSY VALVE (STERILE)

## (undated) DEVICE — FILTERLINE NASAL ADULT O2/CO2

## (undated) DEVICE — SYRINGE 10ML SLIP TIP

## (undated) DEVICE — MEGADYNE ELECTRODE ADULT PT RT

## (undated) DEVICE — MINI LAP PACK-LF: Brand: MEDLINE INDUSTRIES, INC.

## (undated) DEVICE — 60 ML SYRINGE REGULAR TIP: Brand: MONOJECT

## (undated) DEVICE — MEDI-VAC SUCTION HANDLE REGULAR CAPACITY: Brand: CARDINAL HEALTH

## (undated) DEVICE — MASK ISOLATION

## (undated) DEVICE — GOWN,SIRUS,FABRIC-REINFORCED,LARGE: Brand: MEDLINE

## (undated) DEVICE — KENDALL SCD EXPRESS SLEEVES, KNEE LENGTH, MEDIUM: Brand: KENDALL SCD

## (undated) DEVICE — TOTAL HIP CDS: Brand: MEDLINE INDUSTRIES, INC.

## (undated) DEVICE — STERILE LATEX POWDER-FREE SURGICAL GLOVES WITH HYDROGEL COATING, SMOOTH FINISH, STRAIGHT FINGER: Brand: PROTEXIS

## (undated) DEVICE — 1010 S-DRAPE TOWEL DRAPE 10/BX: Brand: STERI-DRAPE™

## (undated) DEVICE — INTENDED TO AID IN THE PASSING OF SUTURES THROUGH BONE AND SOFT TISSUE DURING ORTHOPEDIC SURGERY: Brand: HOFFEE SUTURE RETRIEVER

## (undated) DEVICE — MEDI-VAC NON-CONDUCTIVE SUCTION TUBING: Brand: CARDINAL HEALTH

## (undated) DEVICE — Device: Brand: STABLECUT®

## (undated) DEVICE — STERILE POLYISOPRENE POWDER-FREE SURGICAL GLOVES WITH EMOLLIENT COATING: Brand: PROTEXIS

## (undated) DEVICE — SUTURE VICRYL 2-0 CP-1

## (undated) DEVICE — SOL  .9 1000ML BTL

## (undated) DEVICE — SUTURE VICRYL 0 CP-1

## (undated) DEVICE — PAD SACRAL SPAN AID

## (undated) DEVICE — 3M™ RED DOT™ MONITORING ELECTRODE WITH FOAM TAPE AND STICKY GEL, 50/BAG, 20/CASE, 72/PLT 2570: Brand: RED DOT™

## (undated) DEVICE — Device: Brand: POWER-FLO®

## (undated) DEVICE — CONMED MULTI-PURPOSE SHEATHED CYTOLOGY BRUSH, RING HANDLE, 1.7 MM X 160 CM: Brand: CONMED

## (undated) DEVICE — SINGLE USE SUCTION VALVE MAJ-209: Brand: SINGLE USE SUCTION VALVE (STERILE)

## (undated) DEVICE — BOWLS UTILITY 16OZ

## (undated) DEVICE — SCD SLEEVE KNEE HI BLEND

## (undated) DEVICE — DRAPE,U/SHT,SPLIT,FILM,60X84,STERILE: Brand: MEDLINE

## (undated) DEVICE — GAMMEX® NON-LATEX SIZE 8, STERILE NEOPRENE POWDER-FREE SURGICAL GLOVE: Brand: GAMMEX

## (undated) DEVICE — SHEET,DRAPE,70X100,STERILE: Brand: MEDLINE

## (undated) DEVICE — ENDOSCOPY PACK UPPER: Brand: MEDLINE INDUSTRIES, INC.

## (undated) DEVICE — FAN SPRAY KIT: Brand: PULSAVAC®

## (undated) DEVICE — 3M™ MICROFOAM™ TAPE 1528-4: Brand: 3M™ MICROFOAM™

## (undated) DEVICE — SUTURE ETHIBOND 1 OS-6

## (undated) DEVICE — BULB SYRINGE,IRRIGATION WITH PROTECTIVE CAP: Brand: DOVER

## (undated) NOTE — LETTER
22    RE:  Yelena Miami  : 1927      To whomever it may concern: The above patient is entering hospice due to lung cancer. Her daughters Sinai Black and Veena Contreras will be controlling her affairs and money. Anthony Caro is showing signs of depression and possibly brain metastases as there have been episodes of confusion and insomnia. Therefore she no longer should be allowed to control her own finances. If you have any further questions, feel free to contact us at the above number.     Sincerely,        Cristine Espinoza MD

## (undated) NOTE — ED AVS SNAPSHOT
Amalia Hager   MRN: [id number]    Department:  [facility] Emergency Department   Date of Visit:  12/24/2019           Disclosure     Insurance plans vary and the physician(s) referred by the ER may not be covered by your plan.  Please contact you tell this physician (or your personal doctor if your instructions are to return to your personal doctor) about any new or lasting problems. The primary care or specialist physician will see patients referred from the [facility] Emergency Department.  Erick Julian

## (undated) NOTE — LETTER
Mendy Ren 182 6 13[facility] E  [place], [address] [facility]    Consent for Operation  Date: __________________                                Time: _______________    1.  I authorize the performance upon Meseret Curb the following operation:  *Incision, videotape. The [facility] will not be responsible for storage or maintenance of this tape. 6. For the purpose of advancing medical education, I consent to the admittance of observers to the Operating Room.   7. I authorize the use of any specimen, organs, ti When the patient is a minor or mentally incompetent to give consent:  Signature of person authorized to consent for patient: ___________________________   Relationship to patient: ____________________________________________________    Signature of Witness these medicines may increase my risk of anesthetic complications. iv. If I am allergic to anything or have had a reaction to anesthesia before. 3. I understand how the anesthesia medicine will help me (benefits).   4. I understand that with any type of an patient’s representative) and answered their questions. The patient or their representative has agreed to have anesthesia services.     _____________________________________________________________________________  Witness        Date   Time  I have perlita

## (undated) NOTE — LETTER
Medny Ren 182 6 13[facility] E  [place], [address] [place] [address]    Consent for Operation  Date: __________________                                Time: _______________    1.  I authorize the performance upon Jaime Landau the following operation:  Procedure( procedure has been videotaped, the surgeon will obtain the original videotape. The hospital will not be responsible for storage or maintenance of this tape.   7. For the purpose of advancing medical education, I consent to the admittance of observers to the STATEMENTS REQUIRING INSERTION OR COMPLETION WERE FILLED IN.     Signature of Patient:   ___________________________    When the patient is a minor or mentally incompetent to give consent:  Signature of person authorized to consent for patient: ____________ supplements, and pills I can buy without a prescription (including street drugs/illegal medications). Failure to inform my anesthesiologist about these medicines may increase my risk of anesthetic complications. iv.  If I am allergic to anything or have ha Anesthesiologist Signature     Date   Time  I have discussed the procedure and information above with the patient (or patient’s representative) and answered their questions. The patient or their representative has agreed to have anesthesia services.     ___

## (undated) NOTE — LETTER
12/03/18        Trinh Carranza [id number]      Dear Shekhar Martinez,    [facility] records indicate that you have outstanding lab work and or testing that was ordered for you and has not yet been completed:  Orders Placed This Encounter      Potassium [E]    To provide you with the best possible care, please complete these orders at your earliest convenience. If you have recently completed these orders please disregard this letter. If you have any questions please call the office at Dept: [phone].      Thank you,       EFFIE Galo

## (undated) NOTE — LETTER
Mendy Ren 182 6 13[facility] E  [place], [address] [place] [address]    Consent for Operation  Date: __________________                                Time: _______________    1.  I authorize the performance upon Maria Eugenia Sue the following operation:  Procedure( procedure has been videotaped, the surgeon will obtain the original videotape. The hospital will not be responsible for storage or maintenance of this tape.   7. For the purpose of advancing medical education, I consent to the admittance of observers to the STATEMENTS REQUIRING INSERTION OR COMPLETION WERE FILLED IN.     Signature of Patient:   ___________________________    When the patient is a minor or mentally incompetent to give consent:  Signature of person authorized to consent for patient: ____________ supplements, and pills I can buy without a prescription (including street drugs/illegal medications). Failure to inform my anesthesiologist about these medicines may increase my risk of anesthetic complications. iv.  If I am allergic to anything or have ha Anesthesiologist Signature     Date   Time  I have discussed the procedure and information above with the patient (or patient’s representative) and answered their questions. The patient or their representative has agreed to have anesthesia services.     ___

## (undated) NOTE — IP AVS SNAPSHOT
[address]            (For Outpatient Use Only) Initial Admit Date: 2022   Inpt/Obs Admit Date: Inpt: 22 / Obs: N/A   Discharge Date:    Elida Drilling:  [de-identified]   MRN: [de-identified]   CSN: [id number]   CEID: [id number]        ENCOUNTER  Patient Class: Inpatient Admitting Provider: Marisa Cardoso MD Unit: [address] [place] [address] Service: Medical Attending Provider: Mariah Carter MD   Bed: 2373-M   Visit Type:   Referring Physician: No ref. 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 Home Phone Work Phone Mobile Phone   1. Susi Somers  2.  Cayla Cole Daughter  Daughter    [phone]     GUARANTOR  Guarantor: Carlos A Rogers : 1927 Home Phone: [phone]   Address: [address] [place]  Sex: Female Work Phone:    City/State/Zip: [place]   Rel. to Patient: Self Guarantor ID: [id number]   GUARANTOR EMPLOYER   Employer:  Status: RETIRED     COVERAGE  PRIMARY INSURANCE   Payor: [address] [place] [address],[facility] Floor: [address] [place] [address] Number: [id number] Insurance Type: [id number] Name: Tony Cotter : 1927   Subscriber ID: [id number] Pt Rel to Subscriber: Self   SECONDARY INSURANCE   Payor:  Plan:    Group Number:  Insurance Type:    Subscriber Name:  Subscriber :    Subscriber ID:  Pt Rel to Subscriber:    TERTIARY INSURANCE   Payor:  Plan:    Group Number:  Insurance Type: Subscriber Name:  Mirta Crawley DOB:    Subscriber ID:  Pt Rel to Subscriber:    Hospital Account Financial Class: Medicare Advantage    2022